# Patient Record
Sex: FEMALE | Race: WHITE | NOT HISPANIC OR LATINO
[De-identification: names, ages, dates, MRNs, and addresses within clinical notes are randomized per-mention and may not be internally consistent; named-entity substitution may affect disease eponyms.]

---

## 2017-06-09 ENCOUNTER — APPOINTMENT (OUTPATIENT)
Dept: OTOLARYNGOLOGY | Facility: CLINIC | Age: 67
End: 2017-06-09

## 2017-11-27 ENCOUNTER — APPOINTMENT (OUTPATIENT)
Dept: OTOLARYNGOLOGY | Facility: CLINIC | Age: 67
End: 2017-11-27
Payer: MEDICARE

## 2017-11-27 VITALS
SYSTOLIC BLOOD PRESSURE: 130 MMHG | DIASTOLIC BLOOD PRESSURE: 77 MMHG | HEART RATE: 60 BPM | TEMPERATURE: 97.4 F | OXYGEN SATURATION: 100 %

## 2017-11-27 PROCEDURE — 92557 COMPREHENSIVE HEARING TEST: CPT

## 2017-11-27 PROCEDURE — 99214 OFFICE O/P EST MOD 30 MIN: CPT

## 2017-11-27 PROCEDURE — 92550 TYMPANOMETRY & REFLEX THRESH: CPT

## 2017-11-27 RX ORDER — NEOMYCIN SULFATE, POLYMYXIN B SULFATE AND DEXAMETHASONE 3.5; 10000; 1 MG/ML; [USP'U]/ML; MG/ML
3.5-10000-0.1 SUSPENSION OPHTHALMIC
Qty: 5 | Refills: 0 | Status: ACTIVE | COMMUNITY
Start: 2017-01-24

## 2017-11-27 RX ORDER — ESTROGENS, CONJUGATED 0.3 MG/1
0.3 TABLET, FILM COATED ORAL
Qty: 30 | Refills: 0 | Status: ACTIVE | COMMUNITY
Start: 2017-01-27

## 2018-09-02 ENCOUNTER — INPATIENT (INPATIENT)
Facility: HOSPITAL | Age: 68
LOS: 0 days | Discharge: HOME | End: 2018-09-03
Attending: HOSPITALIST | Admitting: HOSPITALIST

## 2018-09-02 VITALS
OXYGEN SATURATION: 100 % | SYSTOLIC BLOOD PRESSURE: 164 MMHG | HEART RATE: 88 BPM | RESPIRATION RATE: 20 BRPM | TEMPERATURE: 98 F | DIASTOLIC BLOOD PRESSURE: 72 MMHG

## 2018-09-02 LAB
ALBUMIN SERPL ELPH-MCNC: 4.7 G/DL — SIGNIFICANT CHANGE UP (ref 3.5–5.2)
ALP SERPL-CCNC: 63 U/L — SIGNIFICANT CHANGE UP (ref 30–115)
ALT FLD-CCNC: 27 U/L — SIGNIFICANT CHANGE UP (ref 0–41)
ANION GAP SERPL CALC-SCNC: 19 MMOL/L — HIGH (ref 7–14)
APTT BLD: 29.7 SEC — SIGNIFICANT CHANGE UP (ref 27–39.2)
AST SERPL-CCNC: 35 U/L — SIGNIFICANT CHANGE UP (ref 0–41)
BASOPHILS # BLD AUTO: 0.03 K/UL — SIGNIFICANT CHANGE UP (ref 0–0.2)
BASOPHILS NFR BLD AUTO: 0.2 % — SIGNIFICANT CHANGE UP (ref 0–1)
BILIRUB SERPL-MCNC: 0.4 MG/DL — SIGNIFICANT CHANGE UP (ref 0.2–1.2)
BLD GP AB SCN SERPL QL: SIGNIFICANT CHANGE UP
BUN SERPL-MCNC: 17 MG/DL — SIGNIFICANT CHANGE UP (ref 10–20)
CALCIUM SERPL-MCNC: 9.5 MG/DL — SIGNIFICANT CHANGE UP (ref 8.5–10.1)
CHLORIDE SERPL-SCNC: 95 MMOL/L — LOW (ref 98–110)
CK SERPL-CCNC: 125 U/L — SIGNIFICANT CHANGE UP (ref 0–225)
CO2 SERPL-SCNC: 22 MMOL/L — SIGNIFICANT CHANGE UP (ref 17–32)
CREAT SERPL-MCNC: 0.7 MG/DL — SIGNIFICANT CHANGE UP (ref 0.7–1.5)
EOSINOPHIL # BLD AUTO: 0.04 K/UL — SIGNIFICANT CHANGE UP (ref 0–0.7)
EOSINOPHIL NFR BLD AUTO: 0.3 % — SIGNIFICANT CHANGE UP (ref 0–8)
GLUCOSE SERPL-MCNC: 154 MG/DL — HIGH (ref 70–99)
HCT VFR BLD CALC: 38.5 % — SIGNIFICANT CHANGE UP (ref 37–47)
HGB BLD-MCNC: 13.1 G/DL — SIGNIFICANT CHANGE UP (ref 12–16)
IMM GRANULOCYTES NFR BLD AUTO: 0.2 % — SIGNIFICANT CHANGE UP (ref 0.1–0.3)
INR BLD: 0.96 RATIO — SIGNIFICANT CHANGE UP (ref 0.65–1.3)
LYMPHOCYTES # BLD AUTO: 1.58 K/UL — SIGNIFICANT CHANGE UP (ref 1.2–3.4)
LYMPHOCYTES # BLD AUTO: 12.6 % — LOW (ref 20.5–51.1)
MCHC RBC-ENTMCNC: 30.8 PG — SIGNIFICANT CHANGE UP (ref 27–31)
MCHC RBC-ENTMCNC: 34 G/DL — SIGNIFICANT CHANGE UP (ref 32–37)
MCV RBC AUTO: 90.6 FL — SIGNIFICANT CHANGE UP (ref 81–99)
MONOCYTES # BLD AUTO: 0.39 K/UL — SIGNIFICANT CHANGE UP (ref 0.1–0.6)
MONOCYTES NFR BLD AUTO: 3.1 % — SIGNIFICANT CHANGE UP (ref 1.7–9.3)
NEUTROPHILS # BLD AUTO: 10.49 K/UL — HIGH (ref 1.4–6.5)
NEUTROPHILS NFR BLD AUTO: 83.6 % — HIGH (ref 42.2–75.2)
NRBC # BLD: 0 /100 WBCS — SIGNIFICANT CHANGE UP (ref 0–0)
PLATELET # BLD AUTO: 175 K/UL — SIGNIFICANT CHANGE UP (ref 130–400)
POTASSIUM SERPL-MCNC: 4 MMOL/L — SIGNIFICANT CHANGE UP (ref 3.5–5)
POTASSIUM SERPL-SCNC: 4 MMOL/L — SIGNIFICANT CHANGE UP (ref 3.5–5)
PROT SERPL-MCNC: 8 G/DL — SIGNIFICANT CHANGE UP (ref 6–8)
PROTHROM AB SERPL-ACNC: 10.4 SEC — SIGNIFICANT CHANGE UP (ref 9.95–12.87)
RBC # BLD: 4.25 M/UL — SIGNIFICANT CHANGE UP (ref 4.2–5.4)
RBC # FLD: 13.6 % — SIGNIFICANT CHANGE UP (ref 11.5–14.5)
SODIUM SERPL-SCNC: 136 MMOL/L — SIGNIFICANT CHANGE UP (ref 135–146)
TROPONIN T SERPL-MCNC: <0.01 NG/ML — SIGNIFICANT CHANGE UP
TYPE + AB SCN PNL BLD: SIGNIFICANT CHANGE UP
WBC # BLD: 12.56 K/UL — HIGH (ref 4.8–10.8)
WBC # FLD AUTO: 12.56 K/UL — HIGH (ref 4.8–10.8)

## 2018-09-02 RX ORDER — HYDROMORPHONE HYDROCHLORIDE 2 MG/ML
1 INJECTION INTRAMUSCULAR; INTRAVENOUS; SUBCUTANEOUS ONCE
Qty: 0 | Refills: 0 | Status: DISCONTINUED | OUTPATIENT
Start: 2018-09-02 | End: 2018-09-02

## 2018-09-02 RX ORDER — ATORVASTATIN CALCIUM 80 MG/1
40 TABLET, FILM COATED ORAL ONCE
Qty: 0 | Refills: 0 | Status: COMPLETED | OUTPATIENT
Start: 2018-09-02 | End: 2018-09-02

## 2018-09-02 RX ORDER — ASPIRIN/CALCIUM CARB/MAGNESIUM 324 MG
325 TABLET ORAL ONCE
Qty: 0 | Refills: 0 | Status: DISCONTINUED | OUTPATIENT
Start: 2018-09-02 | End: 2018-09-02

## 2018-09-02 RX ORDER — METOCLOPRAMIDE HCL 10 MG
10 TABLET ORAL ONCE
Qty: 0 | Refills: 0 | Status: COMPLETED | OUTPATIENT
Start: 2018-09-02 | End: 2018-09-02

## 2018-09-02 RX ORDER — ACETAMINOPHEN 500 MG
650 TABLET ORAL ONCE
Qty: 0 | Refills: 0 | Status: COMPLETED | OUTPATIENT
Start: 2018-09-02 | End: 2018-09-02

## 2018-09-02 RX ORDER — ONDANSETRON 8 MG/1
4 TABLET, FILM COATED ORAL ONCE
Qty: 0 | Refills: 0 | Status: COMPLETED | OUTPATIENT
Start: 2018-09-02 | End: 2018-09-02

## 2018-09-02 RX ADMIN — ONDANSETRON 4 MILLIGRAM(S): 8 TABLET, FILM COATED ORAL at 22:35

## 2018-09-02 RX ADMIN — Medication 10 MILLIGRAM(S): at 23:30

## 2018-09-02 NOTE — H&P ADULT - NSHPPHYSICALEXAM_GEN_ALL_CORE
VITALS:   T(F): 98.2  HR: 84  BP: 155/61  RR: 18  SpO2: 100%    PHYSICAL EXAM:  GEN: No acute distress  LUNGS: Clear to auscultation bilaterally   HEART: S1/S2 present. RRR.   ABD: Soft, non-tender, non-distended. Bowel sounds present  EXT: NC/NC/NE/ANDRADE  NEURO: AAOX3, L sided numbness VITALS:   T(F): 98.2  HR: 84  BP: 155/61  RR: 18  SpO2: 100%    PHYSICAL EXAM:  GEN: No acute distress  LUNGS: Clear to auscultation bilaterally   HEART: S1/S2 present. RRR.   ABD: Soft, non-tender, non-distended. Bowel sounds present  EXT: NC/NC/NE/ANDRADE  NEURO: AAOX3

## 2018-09-02 NOTE — ED PROVIDER NOTE - PHYSICAL EXAMINATION
VITAL SIGNS: I have reviewed nursing notes and confirm.  CONSTITUTIONAL: Well-developed; well-nourished; in no acute distress. mild l. sided droop  SKIN: skin exam is warm and dry, no acute rash.   HEAD: Normocephalic; atraumatic.  EYES:  EOM intact; conjunctiva and sclera clear.  ENT: No nasal discharge; airway clear. moist oral mucosa; uvula at midline. no pharyngeal erythema, edema exudate or vesicles.   NECK: Supple; non tender.  midline tongue protrusion at midline  CARD: S1, S2 normal; no murmurs, gallops, or rubs. Regular rate and rhythm. posterior tibial and radial pulses 2+  RESP: No wheezes, rales or rhonchi. cta b/l. no use of accessory muscles. no retractions  ABD: Normal bowel sounds; soft; non-distended; non-tender; no rebound.   EXT: Normal ROM. No  cyanosis or edema.  BACK: No cva tenderness  LYMPH: No acute cervical adenopathy.  NEURO: Alert, oriented, grossly unremarkable.  CN 2-12 intact-- except 7- l. droop on l. side of lip; forehead the same.. normal gait. sensory grossly intact to face, upper and lower extremity.  2/5 strength to  Karen.side upper arm.  Lower extremity 5/5 strength b/l  PSYCH: Cooperative, appropriate.

## 2018-09-02 NOTE — ED PROVIDER NOTE - CARE PLAN
Principal Discharge DX:	Numbness and tingling in left arm  Secondary Diagnosis:	Chest pain, unspecified type  Secondary Diagnosis:	Acute nonintractable headache, unspecified headache type

## 2018-09-02 NOTE — H&P ADULT - ASSESSMENT
67 F w/ pmh of migraines, Osteoporosis, OA, presented to the ED c/o headace, nausea, vomiting, l sided numbness, and weakness, stroke code was called, a cth was neg, pt was evaluated by Dr. Rangel (neurology attending), NIH score was 1 (sxs resolved besides l sided numbness), Tpa was offered, pt and family refused tpa, pt being admitted to the stroke unit for further monitoring and workup.    # TIA vs Stroke vs Migraine  - MRI  - F/u Official CTangio results  - Asp, Lipitor  - Neurochecks q4h  - F/u neuro recs in AM    # HO Migraines / OA / Osteoporosis  - Home meds    # DVTppx: Lovenox  # Regular diet  # Full code

## 2018-09-02 NOTE — ED PROVIDER NOTE - NS ED ROS FT
ROS: No fever/chills, No/photophobia/eye pain/changes in vision, No ear pain/sore throat/dysphagia, No palpitations, no SOB/cough/wheeze/stridor, No abdominal pain, No N/V/D/melena, no dysuria/frequency/discharge, No neck/back pain, no rash,     +chest pain +l. sided weakness. +headache

## 2018-09-02 NOTE — ED ADULT NURSE NOTE - CHPI ED NUR SYMPTOMS NEG
no loss of consciousness/no change in level of consciousness/no fever/no dizziness/no blurred vision/no confusion

## 2018-09-02 NOTE — ED ADULT NURSE NOTE - NSIMPLEMENTINTERV_GEN_ALL_ED
Implemented All Fall with Harm Risk Interventions:  [place] to call system. Call bell, personal items and telephone within reach. Instruct patient to call for assistance. Room bathroom lighting operational. Non-slip footwear when patient is off stretcher. Physically safe environment: no spills, clutter or unnecessary equipment. Stretcher in lowest position, wheels locked, appropriate side rails in place. Provide visual cue, wrist band, yellow gown, etc. Monitor gait and stability. Monitor for mental status changes and reorient to person, place, and time. Review medications for side effects contributing to fall risk. Reinforce activity limits and safety measures with patient and family. Provide visual clues: red socks.

## 2018-09-02 NOTE — H&P ADULT - HISTORY OF PRESENT ILLNESS
67 F w/ pmh of migraines, Osteoporosis, OA, presented to the ED by EMS c/o headache, nausea, vomiting, l sided numbness & weakness, she also had a facial droop, a stroke code was called in the ED. The onset of the pts symptoms began around 7pm, After a CTH was negative, Dr. Rangel evaluated the patient through tele-communications, total NIH score was 1, at the time of eval all sxs had resolved except l sided numbness, the pt and family refused Tpa, they insisted sxs are being cause by her migraine. Aspirin 325, Lipitor 40, & Reglan were give to the pt. 67 F w/ pmh of migraines, Osteoporosis, OA, not on any meds besides advil prn presented to the ED by EMS c/o headache, nausea, vomiting, l sided numbness & weakness, she also had a facial droop, a stroke code was called in the ED. The onset of the pts symptoms began around 7pm, after she ate, she said she began having heartburn which she says triggers her migraines. After a CTH was negative, Dr. Rangel evaluated the patient through tele-communications, total NIH score was 1, at the time of that eval all sxs had resolved except L sided numbness, the pt and family refused Tpa, they insisted sxs are being cause by her migraine. Pts L sided numbness resolved as well. Aspirin 325, Lipitor 40, & Reglan were give to the pt.

## 2018-09-02 NOTE — H&P ADULT - NSHPLABSRESULTS_GEN_ALL_CORE
LABS:                        13.1   12.56 )-----------( 175      ( 02 Sep 2018 22:20 )             38.5     09-02    136  |  95<L>  |  17  ----------------------------<  154<H>  4.0   |  22  |  0.7    Ca    9.5      02 Sep 2018 22:20    TPro  8.0  /  Alb  4.7  /  TBili  0.4  /  DBili  x   /  AST  35  /  ALT  27  /  AlkPhos  63  09-02    PT/INR - ( 02 Sep 2018 22:20 )   PT: 10.40 sec;   INR: 0.96 ratio         PTT - ( 02 Sep 2018 22:20 )  PTT:29.7 sec      Creatine Kinase, Serum: 125 U/L (09-02-18 @ 22:20)  Troponin T, Serum: <0.01 ng/mL (09-02-18 @ 22:20)      CARDIAC MARKERS ( 02 Sep 2018 22:20 )  x     / <0.01 ng/mL / 125 U/L / x     / x          RADIOLOGY:  CTH  IMPRESSION:  1.  No evidence of acute intracranial pathology.  2.  Chronic microvascular ischemic changes as above.    Dr. Bladimir Daigle discussed preliminary findings with ERI ACUNA MD   on 9/2/2018 11:30 PM with readback.    CTN ANGIO  IMPRESSION:  1.  No perfusion defects to suggest acute cerebral ischemia.  2.  Unremarkable CTA of the head and neck.  PRELIM READ

## 2018-09-02 NOTE — ED PROVIDER NOTE - ATTENDING CONTRIBUTION TO CARE
67y F PMH Osteoporosis, Migraine, who presents with acute Headache with chest pain, LUE paresthesias and weakness to left upper arm. Onset was 7 pm and patient within Stroke Code Window.  Stroke Code called.  FS done and normal, patient initially hypertensive.  Patient has 5/5 power to left arm but mildly decreased  strength.  Stroke score 1 for subjective decreased sensation to left side of body. Given CP history and headache with neuro symptoms, aortic dissection considered.  Patient rushed for non-contrast head CT and CT angiogram/perfusion.  This was lowered to include aortic arch.  CTA negative for proximal dissection or LVO.  After CT scans, patient assessed by Dr. Rangel on Tele Stroke.  Patient found to have residual persistent numbness L side currently unchanged from my exam and again an NIH Stroke score of 1.  Thrombolysis with IV TPA discussed with patient and family in detail by Dr. Rangle and myself. Patient and family do not want t-PA at this time due to risks of the medication and the small stroke score. They are aware of the time limitations in this decision. All questions and concerns at the time of evaluation answered and addressed.      Patient already took Aspirin.  Atorvastatin given and will give headache medication.  Complicated Migraine considered.  Patient low risk for chest pain with low HEART score and non-ischemic EKG.  Left sided chest pain is reproducible and happened after patient vomited.  May be musculoskeletal in nature.  Patient does not have chest pain going to back and is currently (11:52 pm) chest pain free.  I do not feel a full dissection study through descending/abdominal aorta is warranted at this time.  Patient to be admitted to Stroke Unit for MRI and further stroke work up, repeat enzymes.

## 2018-09-02 NOTE — ED ADULT NURSE REASSESSMENT NOTE - NS ED NURSE REASSESS COMMENT FT1
Pt arrived with 4 family members presented with stroke-like symptoms c/o left arm tingling, weakness, chest pain, nausea and vomiting since 7 pm today. On arrival code stroke was called, pt was taken to the CT scan after Zofran was administered for vomiting. While pt was evaluated by the resident, family demanded an attending and not the resident. When Dr Subramanian came to evaluated the pt., family became very rude towards him. After pt came back from CT scan, tele stroke was activated as per protocol. When tele RN came on the screen, all the information was provided to her and one of the family members made a comment that this is not a doctor but an RN, they started demanding the doctor again. Family was told that the doctor will appear on the screen right after all the information in taken by ICU RN. Family kept interrupting my conversation with tele RN and than they asked me where the tele team is located. I answered that I do not know, which made family member to interrupt the tele RN and ask her where they are located Tele RN answered that they are located in [place] The family member told another family member : "Well, now she knows". During my care for the patient family members were very disrespectful towards all the team in the ER including EMS they were constantly staying on our way of treating the patient. Charge RN and security were called for assistance Family were told that they will have to leave if they will continue presenting the same behavior.

## 2018-09-02 NOTE — CONSULT NOTE ADULT - SUBJECTIVE AND OBJECTIVE BOX
***STROKE ALERT - TELESTROKE CONSULTATION  09-02-18 @ 23:24  Last known normal time:	 19:00 EDT  Consultant paged:	  22:15 EDT  Video start:	              22:55 EDT  Video end:	              23:15 EDT  Video total:	              20 mins    This is a telehealth visit and the patient is being seen on  9/2/2018 using bi-directional audio/video at the request of Dr. Subramanian at [facility].  	                                                     Chief Complaint: stroke alert    Last known normal time: 19:00 EDT    HPI:  68 yo RhF w/ h/o migraine, OA/OP currently p/w acute HA/CP with nausea with L hemisensory loss currently persistent.  Symptoms started at 19:00 EDT currently persistent numbness.  No weakness, diplopia, visual obscuration, vertigo.  No prior episodes.  Denies ICH, aneurysm, trauma, recent surgery,  no bleeding diathesis.  no anticoagulation use.    PAST MEDICAL & SURGICAL HISTORY:  migraine  OA/OP    FAMILY HISTORY:  [place]    Social History: (-) x 3    Allergies    No Known Allergies    Intolerances    MEDICATIONS  (STANDING):  acetaminophen   Tablet. 650 milliGRAM(s) Oral once  atorvastatin 40 milliGRAM(s) Oral once  HYDROmorphone  Injectable 1 milliGRAM(s) IV Push Once  metoclopramide Injectable 10 milliGRAM(s) IV Push once  ondansetron Injectable 4 milliGRAM(s) IV Push once    Review of systems:    Constitutional: No fever, weight loss or fatigue    Eyes: No eye pain or discharge  ENMT:  No difficulty hearing; No sinus or throat pain  Neck: No pain or stiffness  Respiratory: No cough, wheezing, chills or hemoptysis  Cardiovascular: No chest pain, palpitations, shortness of breath, dyspnea on exertion  Gastrointestinal: No abdominal pain, nausea, vomiting or hematemesis; No diarrhea or constipation.   Genitourinary: No dysuria, frequency, hematuria or incontinence  Neurological: As per HPI  Skin: No rashes or lesions   Endocrine: No heat or cold intolerance; No hair loss  Musculoskeletal: No joint pain or swelling  Psychiatric: No depression, anxiety, mood swings  Heme/Lymph: No easy bruising or bleeding gums    Vital Signs Last 24 Hrs  T(C): --  T(F): --  HR: --  BP: --  BP(mean): --  RR: --  SpO2: --    Video assisted telemedicine examination performed w/ RN and family at bedside.    Neurologic Examination:  General:  Appearance is consistent with chronologic age.  No abnormal facies.   General: The patient is oriented to person, place, time and date.   Fund of knowledge is intact and normal.  Language with normal repetition, comprehension and naming.  Nondysarthric.    Cranial nerves: intact VA, VFF.  EOMI w/o nystagmus, skew or reported double vision.  PERRL.  No ptosis/weakness of eyelid closure.  Facial sensation is normal with normal bite.  No facial asymmetry.  Hearing grossly intact b/l.  Palate elevates midline.  Tongue midline.  Motor examination:   Normal tone, bulk and range of motion.  No tenderness, twitching, tremors or involuntary movements.  Formal Muscle Strength Testing: (MRC grade R/L) 5/5 UE; 5/5 LE.  No observable drift.  Reflexes:   2+ b/l pectoralis, biceps, triceps, brachioradialis, patella and Achilles.  Plantar response downgoing b/l.  Jaw jerk, Norman, clonus absent.  Sensory examination:   subjectively decreased to light touch L side.  Cerebellum:   FTN/HKS intact with normal RICKEY in all limbs.  No dysmetria or dysdiadokinesia.      NIH STROKE SCALE  Item	                                                        Score  1 a.	Level of Consciousness	                0  1 b. LOC Questions	                                0  1 c.	LOC Commands	                                0  2.	Best Gaze	                                        0  3.	Visual	                                                0  4.	Facial Palsy	                                        0  5 a.	Motor Arm - Left	                                0  5 b.	Motor Arm - Right	                        0  6 a.	Motor Leg - Left	                                0  6 b.	Motor Leg - Right	                                0  7.	Limb Ataxia	                                        0  8.	Sensory	                                                1  9.	Language	                                        0  10.	Dysarthria	                                        0  11.	Extinction and Inattention  	        0  ______________________________________  TOTAL	                                                        1    Labs:   CBC Full  -  ( 02 Sep 2018 22:20 )  WBC Count : 12.56 K/uL  Hemoglobin : 13.1 g/dL  Hematocrit : 38.5 %  Platelet Count - Automated : 175 K/uL  Mean Cell Volume : 90.6 fL  Mean Cell Hemoglobin : 30.8 pg  Mean Cell Hemoglobin Concentration : 34.0 g/dL  Auto Neutrophil # : 10.49 K/uL  Auto Lymphocyte # : 1.58 K/uL  Auto Monocyte # : 0.39 K/uL  Auto Eosinophil # : 0.04 K/uL  Auto Basophil # : 0.03 K/uL  Auto Neutrophil % : 83.6 %  Auto Lymphocyte % : 12.6 %  Auto Monocyte % : 3.1 %  Auto Eosinophil % : 0.3 %  Auto Basophil % : 0.2 %    09-02    136  |  95<L>  |  17  ----------------------------<  154<H>  4.0   |  22  |  0.7    Ca    9.5      02 Sep 2018 22:20    TPro  8.0  /  Alb  4.7  /  TBili  0.4  /  DBili  x   /  AST  35  /  ALT  27  /  AlkPhos  63  09-02    LIVER FUNCTIONS - ( 02 Sep 2018 22:20 )  Alb: 4.7 g/dL / Pro: 8.0 g/dL / ALK PHOS: 63 U/L / ALT: 27 U/L / AST: 35 U/L / GGT: x           PT/INR - ( 02 Sep 2018 22:20 )   PT: 10.40 sec;   INR: 0.96 ratio         PTT - ( 02 Sep 2018 22:20 )  PTT:29.7 sec    POCT Blood Glucose.: 141 mg/dL (09-02-18 @ 22:17)      Neuroimaging:  NCHCT:   CT Angiography/Perfusion (if applicable):  MRI Brain NC (if applicable):  MRA Head/Neck (if applicable):  Echocardiography:  [place]/Transcranial Duplex:    Assessment:  This is a 67y Female with h/o migraine, OP/OA p/w acute HA w/ CP, LUE paresthesias with residual persistent numbness L side currently unchanged.  Pt is a candidate for thrombolysis with IV TPA. Risks and benefits including alternatives to treatment with thrombolysis with IV TPA (alteplase) discussed with patient/family at length including significant morbidity/mortality with or without treatment in acute stroke setting.  All questions and concerns at the time of evaluation answered and addressed.  Pt and family currently declines thrombolysis.  Recommend f/u CTA results r/o dissection.  Recommend admit for further stroke management and w/u.      Treat with IV TPA:                 No  IV TPA bolus time:   TPA total dose:  TPA bolus dose:  If NO IV TPA, then why:        Pt/family declined    Neurovascular intervention candidate:    No	  If NO intervention, then why:     no LVO    Plan:   - MRI Brain NC  - f/u official CTA: if no LVO or dissection, may admit to stroke unit  -serial neurochecks Q4 hrs after CTA  - cont ASA 81, Lipitor 40  - cardiac telemetry  - permissive BP keep SBP b/w 130 - 170    09-02-18 @ 23:24          Neurologic Examination:  General:  Appearance is consistent with chronologic age.  No abnormal facies.   General: The patient is oriented to person, place, time and date.   Fund of knowledge is intact and normal.  Language with normal repetition, comprehension and naming.  Nondysarthric.    Cranial nerves: intact VA, VFF.  EOMI w/o nystagmus, skew or reported double vision.  PERRL.  No ptosis/weakness of eyelid closure.  Facial sensation is normal with normal bite.  No facial asymmetry.  Hearing grossly intact b/l.  Palate elevates midline.  Tongue midline.  Motor examination:   Normal tone, bulk and range of motion.  No tenderness, twitching, tremors or involuntary movements.  Formal Muscle Strength Testing: (MRC grade R/L) 5/5 UE; 5/5 LE.  No observable drift.  Reflexes:   2+ b/l pectoralis, biceps, triceps, brachioradialis, patella and Achilles.  Plantar response downgoing b/l.  Jaw jerk, Norman, clonus absent.  Sensory examination:   subjectively decreased to light touch L side.  Cerebellum:   FTN/HKS intact with normal RICKEY in all limbs.  No dysmetria or dysdiadokinesia.    NIH Stroke Scale: 1    Labs:   CBC Full  -  ( 02 Sep 2018 22:20 )  WBC Count : 12.56 K/uL  Hemoglobin : 13.1 g/dL  Hematocrit : 38.5 %  Platelet Count - Automated : 175 K/uL  Mean Cell Volume : 90.6 fL  Mean Cell Hemoglobin : 30.8 pg  Mean Cell Hemoglobin Concentration : 34.0 g/dL  Auto Neutrophil # : 10.49 K/uL  Auto Lymphocyte # : 1.58 K/uL  Auto Monocyte # : 0.39 K/uL  Auto Eosinophil # : 0.04 K/uL  Auto Basophil # : 0.03 K/uL  Auto Neutrophil % : 83.6 %  Auto Lymphocyte % : 12.6 %  Auto Monocyte % : 3.1 %  Auto Eosinophil % : 0.3 %  Auto Basophil % : 0.2 %    09-02    136  |  95<L>  |  17  ----------------------------<  154<H>  4.0   |  22  |  0.7    Ca    9.5      02 Sep 2018 22:20    TPro  8.0  /  Alb  4.7  /  TBili  0.4  /  DBili  x   /  AST  35  /  ALT  27  /  AlkPhos  63  09-02    LIVER FUNCTIONS - ( 02 Sep 2018 22:20 )  Alb: 4.7 g/dL / Pro: 8.0 g/dL / ALK PHOS: 63 U/L / ALT: 27 U/L / AST: 35 U/L / GGT: x           PT/INR - ( 02 Sep 2018 22:20 )   PT: 10.40 sec;   INR: 0.96 ratio         PTT - ( 02 Sep 2018 22:20 )  PTT:29.7 sec    POCT Blood Glucose.: 141 mg/dL (09-02-18 @ 22:17)    CTH: (prelim) no acute intracranial pathology  CTA: (prelim): no LVO    Assessment:  This is a 67y Female with h/o migraine, OP/OA p/w acute HA w/ CP, LUE paresthesias with residual persistent numbness L side currently unchanged.  Pt is a candidate for thrombolysis with IV TPA. Risks and benefits including alternatives to treatment with thrombolysis with IV TPA (alteplase) discussed with patient/family at length including significant morbidity/mortality with or without treatment in acute stroke setting.  All questions and concerns at the time of evaluation answered and addressed.  Pt and family currently declines thrombolysis.  Recommend f/u CTA results r/o dissection.  Recommend admit for further stroke management and w/u.    Plan:   - MRI Brain NC  - f/u official CTA: if no LVO or dissection, may admit to stroke unit  -serial neurochecks Q4 hrs after CTA  - cont ASA 81, Lipitor 40  - cardiac telemetry  - permissive BP keep SBP b/w 130 - 170    09-02-18 @ 23:15

## 2018-09-02 NOTE — ED PROVIDER NOTE - OBJECTIVE STATEMENT
68y/o F w/ hx of arthritis presents for generalized headache, nausea, left sided weakness, L. tight chest pain. 68y/o F w/ hx of arthritis presents for generalized headache, nausea, left sided weakness, L. tight chest pain that started at 7pm. no  loc.  pt received 325mg of aspirin by ems. 66y/o F w/ hx of arthritis presents for generalized headache, nausea, left sided numbness, weakness, L. tight chest pain that started at 7pm. no  loc.  pt received 325mg of aspirin by ems.

## 2018-09-02 NOTE — ED PROVIDER NOTE - MEDICAL DECISION MAKING DETAILS
Thrombolysis with IV TPA discussed with patient and family in detail by Dr. Rangel and myself. Patient and family do not want t-PA at this time due to risks of the medication and the small stroke score. They are aware of the time limitations in this decision. All questions and concerns at the time of evaluation answered and addressed.      Patient already took Aspirin.  Atorvastatin given and will give headache medication.  Complicated Migraine considered.  Patient low risk for chest pain with low HEART score and non-ischemic EKG.  Left sided chest pain is reproducible and happened after patient vomited.  May be musculoskeletal in nature.  Patient does not have chest pain going to back and is currently (11:52 pm) chest pain free.  I do not feel a full dissection study through descending/abdominal aorta is warranted at this time.  Patient to be admitted to Stroke Unit for MRI and further stroke work up, repeat enzymes.

## 2018-09-02 NOTE — H&P ADULT - ATTENDING COMMENTS
Patient seen and examined independently. Case discussed with housestaff, nursing, social, patient, daughters. I agree with the resident's note, physical exam, and plan except as below.     67 F w/ pmh of migraines, Osteoporosis, OA, multiple food allergies presented to the ED c/o headache, nausea, vomiting, Lt breast tenderness, LUE sided numbness, and weakness after eating at a restaurant. Stroke code was called, a cth was neg, pt was evaluated by Dr. Rangel (neurology attending), NIH score was 1 (sxs resolved besides l sided numbness), Tpa was offered, pt and family refused tpa, pt being admitted to the stroke unit for further monitoring and workup. Today, LUE numbness resolved.    Denies CP, SOB, D/C/AP, cough, F, chills, dizziness, new focal weakness, HA, vision changes, dysuria, or urinary symptoms, blood in stool.    ROS: all systems unremarkable except as above.     Gen: NAD, AA0x3  HEENT: PERRLA, EOM, no LAD  CV: nl S1 S2  Resp: decreased BS b/l  GI: NT/ND/S +BS  MS: no c/c/e  Neuro: decreased sens Lt lower face, mild Lt facial droop    # TIA vs Stroke vs Migraine  - MRI brain   - Asp, Lipitor  - Neurochecks q4h  - F/u neuro recs in AM    # HO Migraines / OA / Osteoporosis  - Home meds    # DVTppx: Lovenox  # Regular diet  # Full code    d/w family in great detail

## 2018-09-03 ENCOUNTER — TRANSCRIPTION ENCOUNTER (OUTPATIENT)
Age: 68
End: 2018-09-03

## 2018-09-03 VITALS
HEART RATE: 70 BPM | RESPIRATION RATE: 18 BRPM | SYSTOLIC BLOOD PRESSURE: 118 MMHG | DIASTOLIC BLOOD PRESSURE: 57 MMHG | TEMPERATURE: 98 F

## 2018-09-03 LAB
ANION GAP SERPL CALC-SCNC: 12 MMOL/L — SIGNIFICANT CHANGE UP (ref 7–14)
APPEARANCE UR: CLEAR — SIGNIFICANT CHANGE UP
BACTERIA # UR AUTO: ABNORMAL /HPF
BASOPHILS # BLD AUTO: 0.03 K/UL — SIGNIFICANT CHANGE UP (ref 0–0.2)
BASOPHILS NFR BLD AUTO: 0.3 % — SIGNIFICANT CHANGE UP (ref 0–1)
BILIRUB UR-MCNC: NEGATIVE — SIGNIFICANT CHANGE UP
BUN SERPL-MCNC: 12 MG/DL — SIGNIFICANT CHANGE UP (ref 10–20)
CALCIUM SERPL-MCNC: 8.7 MG/DL — SIGNIFICANT CHANGE UP (ref 8.5–10.1)
CHLORIDE SERPL-SCNC: 103 MMOL/L — SIGNIFICANT CHANGE UP (ref 98–110)
CHOLEST SERPL-MCNC: 201 MG/DL — HIGH (ref 100–200)
CK MB CFR SERPL CALC: 2.4 NG/ML — SIGNIFICANT CHANGE UP (ref 0.6–6.3)
CK SERPL-CCNC: 102 U/L — SIGNIFICANT CHANGE UP (ref 0–225)
CO2 SERPL-SCNC: 26 MMOL/L — SIGNIFICANT CHANGE UP (ref 17–32)
COLOR SPEC: YELLOW — SIGNIFICANT CHANGE UP
CREAT SERPL-MCNC: 0.7 MG/DL — SIGNIFICANT CHANGE UP (ref 0.7–1.5)
DIFF PNL FLD: NEGATIVE — SIGNIFICANT CHANGE UP
EOSINOPHIL # BLD AUTO: 0.03 K/UL — SIGNIFICANT CHANGE UP (ref 0–0.7)
EOSINOPHIL NFR BLD AUTO: 0.3 % — SIGNIFICANT CHANGE UP (ref 0–8)
EPI CELLS # UR: ABNORMAL /HPF
GLUCOSE SERPL-MCNC: 82 MG/DL — SIGNIFICANT CHANGE UP (ref 70–99)
GLUCOSE UR QL: NEGATIVE — SIGNIFICANT CHANGE UP
HCT VFR BLD CALC: 36.5 % — LOW (ref 37–47)
HDLC SERPL-MCNC: 85 MG/DL — SIGNIFICANT CHANGE UP
HGB BLD-MCNC: 11.7 G/DL — LOW (ref 12–16)
IMM GRANULOCYTES NFR BLD AUTO: 0.2 % — SIGNIFICANT CHANGE UP (ref 0.1–0.3)
KETONES UR-MCNC: 15
LEUKOCYTE ESTERASE UR-ACNC: ABNORMAL
LIPID PNL WITH DIRECT LDL SERPL: 119 MG/DL — SIGNIFICANT CHANGE UP (ref 4–129)
LYMPHOCYTES # BLD AUTO: 1.87 K/UL — SIGNIFICANT CHANGE UP (ref 1.2–3.4)
LYMPHOCYTES # BLD AUTO: 20.8 % — SIGNIFICANT CHANGE UP (ref 20.5–51.1)
MCHC RBC-ENTMCNC: 29.5 PG — SIGNIFICANT CHANGE UP (ref 27–31)
MCHC RBC-ENTMCNC: 32.1 G/DL — SIGNIFICANT CHANGE UP (ref 32–37)
MCV RBC AUTO: 92.2 FL — SIGNIFICANT CHANGE UP (ref 81–99)
MONOCYTES # BLD AUTO: 0.49 K/UL — SIGNIFICANT CHANGE UP (ref 0.1–0.6)
MONOCYTES NFR BLD AUTO: 5.5 % — SIGNIFICANT CHANGE UP (ref 1.7–9.3)
NEUTROPHILS # BLD AUTO: 6.55 K/UL — HIGH (ref 1.4–6.5)
NEUTROPHILS NFR BLD AUTO: 72.9 % — SIGNIFICANT CHANGE UP (ref 42.2–75.2)
NITRITE UR-MCNC: NEGATIVE — SIGNIFICANT CHANGE UP
NRBC # BLD: 0 /100 WBCS — SIGNIFICANT CHANGE UP (ref 0–0)
PH UR: 8 — SIGNIFICANT CHANGE UP (ref 5–8)
PLATELET # BLD AUTO: 163 K/UL — SIGNIFICANT CHANGE UP (ref 130–400)
POTASSIUM SERPL-MCNC: 4 MMOL/L — SIGNIFICANT CHANGE UP (ref 3.5–5)
POTASSIUM SERPL-SCNC: 4 MMOL/L — SIGNIFICANT CHANGE UP (ref 3.5–5)
PROT UR-MCNC: 30
RBC # BLD: 3.96 M/UL — LOW (ref 4.2–5.4)
RBC # FLD: 13.6 % — SIGNIFICANT CHANGE UP (ref 11.5–14.5)
SODIUM SERPL-SCNC: 141 MMOL/L — SIGNIFICANT CHANGE UP (ref 135–146)
SP GR SPEC: >=1.03 — SIGNIFICANT CHANGE UP (ref 1.01–1.03)
TOTAL CHOLESTEROL/HDL RATIO MEASUREMENT: 2.4 RATIO — LOW (ref 4–5.5)
TRIGL SERPL-MCNC: 60 MG/DL — SIGNIFICANT CHANGE UP (ref 10–149)
TROPONIN T SERPL-MCNC: <0.01 NG/ML — SIGNIFICANT CHANGE UP
UROBILINOGEN FLD QL: 0.2 — SIGNIFICANT CHANGE UP (ref 0.2–0.2)
WBC # BLD: 8.99 K/UL — SIGNIFICANT CHANGE UP (ref 4.8–10.8)
WBC # FLD AUTO: 8.99 K/UL — SIGNIFICANT CHANGE UP (ref 4.8–10.8)
WBC UR QL: ABNORMAL /HPF

## 2018-09-03 RX ORDER — ATORVASTATIN CALCIUM 80 MG/1
80 TABLET, FILM COATED ORAL AT BEDTIME
Qty: 0 | Refills: 0 | Status: DISCONTINUED | OUTPATIENT
Start: 2018-09-03 | End: 2018-09-03

## 2018-09-03 RX ORDER — ACETAMINOPHEN 500 MG
650 TABLET ORAL EVERY 6 HOURS
Qty: 0 | Refills: 0 | Status: DISCONTINUED | OUTPATIENT
Start: 2018-09-03 | End: 2018-09-03

## 2018-09-03 RX ORDER — ENOXAPARIN SODIUM 100 MG/ML
40 INJECTION SUBCUTANEOUS EVERY 24 HOURS
Qty: 0 | Refills: 0 | Status: DISCONTINUED | OUTPATIENT
Start: 2018-09-03 | End: 2018-09-03

## 2018-09-03 RX ORDER — PANTOPRAZOLE SODIUM 20 MG/1
40 TABLET, DELAYED RELEASE ORAL
Qty: 0 | Refills: 0 | Status: DISCONTINUED | OUTPATIENT
Start: 2018-09-03 | End: 2018-09-03

## 2018-09-03 RX ORDER — ATORVASTATIN CALCIUM 80 MG/1
1 TABLET, FILM COATED ORAL
Qty: 30 | Refills: 0 | OUTPATIENT
Start: 2018-09-03 | End: 2018-10-02

## 2018-09-03 RX ORDER — ASPIRIN/CALCIUM CARB/MAGNESIUM 324 MG
81 TABLET ORAL DAILY
Qty: 0 | Refills: 0 | Status: DISCONTINUED | OUTPATIENT
Start: 2018-09-03 | End: 2018-09-03

## 2018-09-03 RX ORDER — ASPIRIN/CALCIUM CARB/MAGNESIUM 324 MG
1 TABLET ORAL
Qty: 0 | Refills: 0 | COMMUNITY
Start: 2018-09-03

## 2018-09-03 RX ORDER — METOCLOPRAMIDE HCL 10 MG
10 TABLET ORAL EVERY 8 HOURS
Qty: 0 | Refills: 0 | Status: DISCONTINUED | OUTPATIENT
Start: 2018-09-03 | End: 2018-09-03

## 2018-09-03 RX ADMIN — Medication 81 MILLIGRAM(S): at 11:19

## 2018-09-03 RX ADMIN — ENOXAPARIN SODIUM 40 MILLIGRAM(S): 100 INJECTION SUBCUTANEOUS at 06:27

## 2018-09-03 RX ADMIN — PANTOPRAZOLE SODIUM 40 MILLIGRAM(S): 20 TABLET, DELAYED RELEASE ORAL at 06:28

## 2018-09-03 RX ADMIN — ATORVASTATIN CALCIUM 40 MILLIGRAM(S): 80 TABLET, FILM COATED ORAL at 00:23

## 2018-09-03 NOTE — PROGRESS NOTE ADULT - SUBJECTIVE AND OBJECTIVE BOX
Neurology Progress Note    Interval History:      HPI:  67 F w/ pmh of migraines, Osteoporosis, OA, not on any meds besides advil prn presented to the ED by EMS c/o headache, nausea, vomiting, l sided numbness & weakness, she also had a facial droop, a stroke code was called in the ED. The onset of the pts symptoms began around 7pm, after she ate, she said she began having heartburn which she says triggers her migraines. After a CTH was negative, Dr. Rangel evaluated the patient through tele-communications, total NIH score was 1, at the time of that eval all sxs had resolved except L sided numbness, the pt and family refused Tpa, they insisted sxs are being cause by her migraine. Pts L sided numbness resolved as well. Aspirin 325, Lipitor 40, & Reglan were give to the pt. (02 Sep 2018 23:57)      PAST MEDICAL & SURGICAL HISTORY:  Arthritis          Vital Signs Last 24 Hrs  T(C): 36.6 (03 Sep 2018 11:38), Max: 36.8 (02 Sep 2018 22:25)  T(F): 97.9 (03 Sep 2018 11:38), Max: 98.2 (02 Sep 2018 22:25)  HR: 75 (03 Sep 2018 11:38) (72 - 88)  BP: 117/54 (03 Sep 2018 11:38) (100/62 - 164/72)  BP(mean): --  RR: 16 (03 Sep 2018 11:38) (16 - 20)  SpO2: 96% (03 Sep 2018 11:38) (96% - 100%)    Neurological Exam:   Mental status: Awake, alert and oriented x3.  Recent and remote memory intact.  Naming, repetition and comprehension intact.  Attention/concentration intact.  No dysarthria, no aphasia.  Fund of knowledge appropriate.    Cranial nerves: Pupils equally round and reactive to light, visual fields full, no nystagmus, extraocular muscles intact, V1 through V3 intact bilaterally and symmetric, face symmetric, hearing intact to finger rub, palate elevation symmetric, tongue was midline.  Motor:  MRC grading 5/5 b/l UE/LE.   strength 5/5.  Normal tone and bulk.  No abnormal movements.    Sensation: Intact to light touch, proprioception, and pinprick.   Coordination: No dysmetria on finger-to-nose and heel-to-shin.  No dysdiadokinesia.  Reflexes: 2+ in bilateral UE/LE, downgoing toes bilaterally. (-) Rios.  Gait: Narrow and steady. No ataxia.  Romberg negative    acetaminophen   Tablet. 650 milliGRAM(s) Oral every 6 hours PRN  aspirin  chewable 81 milliGRAM(s) Oral daily  atorvastatin 80 milliGRAM(s) Oral at bedtime  enoxaparin Injectable 40 milliGRAM(s) SubCutaneous every 24 hours  metoclopramide Injectable 10 milliGRAM(s) IV Push every 8 hours PRN  pantoprazole    Tablet 40 milliGRAM(s) Oral before breakfast      Labs:  CBC Full  -  ( 03 Sep 2018 07:42 )  WBC Count : 8.99 K/uL  Hemoglobin : 11.7 g/dL  Hematocrit : 36.5 %  Platelet Count - Automated : 163 K/uL  Mean Cell Volume : 92.2 fL  Mean Cell Hemoglobin : 29.5 pg  Mean Cell Hemoglobin Concentration : 32.1 g/dL  Auto Neutrophil # : 6.55 K/uL  Auto Lymphocyte # : 1.87 K/uL  Auto Monocyte # : 0.49 K/uL  Auto Eosinophil # : 0.03 K/uL  Auto Basophil # : 0.03 K/uL  Auto Neutrophil % : 72.9 %  Auto Lymphocyte % : 20.8 %  Auto Monocyte % : 5.5 %  Auto Eosinophil % : 0.3 %  Auto Basophil % : 0.3 %    09-03    141  |  103  |  12  ----------------------------<  82  4.0   |  26  |  0.7    Ca    8.7      03 Sep 2018 07:42    TPro  8.0  /  Alb  4.7  /  TBili  0.4  /  DBili  x   /  AST  35  /  ALT  27  /  AlkPhos  63  09-02    LIVER FUNCTIONS - ( 02 Sep 2018 22:20 )  Alb: 4.7 g/dL / Pro: 8.0 g/dL / ALK PHOS: 63 U/L / ALT: 27 U/L / AST: 35 U/L / GGT: x           PT/INR - ( 02 Sep 2018 22:20 )   PT: 10.40 sec;   INR: 0.96 ratio         PTT - ( 02 Sep 2018 22:20 )  PTT:29.7 sec  Urinalysis Basic - ( 02 Sep 2018 23:15 )    Color: Yellow / Appearance: Clear / SG: >=1.030 / pH: x  Gluc: x / Ketone: 15  / Bili: Negative / Urobili: 0.2   Blood: x / Protein: 30 / Nitrite: Negative   Leuk Esterase: Small / RBC: x / WBC 6-10 /HPF   Sq Epi: x / Non Sq Epi: Few /HPF / Bacteria: Few /HPF    < from: Transthoracic Echocardiogram (09.03.18 @ 07:19) >  Summary:   1. Left ventricular ejection fraction, by visual estimation, is 55 to   60%.   2. Spectral Doppler shows impaired relaxation pattern of left   ventricular myocardial filling (Grade I diastolic dysfunction).   3. Trace tricuspid regurgitation.    < end of copied text >    Assessment:  This is a 67y Female w/ h/o     Plan: Neurology Progress Note    Interval History:  Pt currently back to baseline.  No focal deficits.      PAST MEDICAL & SURGICAL HISTORY:  Arthritis    Vital Signs Last 24 Hrs  T(C): 36.6 (03 Sep 2018 11:38), Max: 36.8 (02 Sep 2018 22:25)  T(F): 97.9 (03 Sep 2018 11:38), Max: 98.2 (02 Sep 2018 22:25)  HR: 75 (03 Sep 2018 11:38) (72 - 88)  BP: 117/54 (03 Sep 2018 11:38) (100/62 - 164/72)  BP(mean): --  RR: 16 (03 Sep 2018 11:38) (16 - 20)  SpO2: 96% (03 Sep 2018 11:38) (96% - 100%)    Neurological Exam:   Mental status: Awake, alert and oriented x3.  Recent and remote memory intact.  Naming, repetition and comprehension intact.  Attention/concentration intact.  No dysarthria, no aphasia.  Fund of knowledge appropriate.    Cranial nerves: Pupils equally round and reactive to light, visual fields full, no nystagmus, extraocular muscles intact, V1 through V3 intact bilaterally and symmetric, face symmetric, hearing intact to finger rub, palate elevation symmetric, tongue was midline.  Motor:  MRC grading 5/5 b/l UE/LE.   strength 5/5.  Normal tone and bulk.  No abnormal movements.    Sensation: Intact to light touch, proprioception, and pinprick.   Coordination: No dysmetria on finger-to-nose and heel-to-shin.  No dysdiadokinesia.  Reflexes: 2+ in bilateral UE/LE, downgoing toes bilaterally. (-) Rios.  Gait: Narrow and steady. No ataxia.  Romberg negative    NIHSS 0    acetaminophen   Tablet. 650 milliGRAM(s) Oral every 6 hours PRN  aspirin  chewable 81 milliGRAM(s) Oral daily  atorvastatin 80 milliGRAM(s) Oral at bedtime  enoxaparin Injectable 40 milliGRAM(s) SubCutaneous every 24 hours  metoclopramide Injectable 10 milliGRAM(s) IV Push every 8 hours PRN  pantoprazole    Tablet 40 milliGRAM(s) Oral before breakfast    Labs:  CBC Full  -  ( 03 Sep 2018 07:42 )  WBC Count : 8.99 K/uL  Hemoglobin : 11.7 g/dL  Hematocrit : 36.5 %  Platelet Count - Automated : 163 K/uL  Mean Cell Volume : 92.2 fL  Mean Cell Hemoglobin : 29.5 pg  Mean Cell Hemoglobin Concentration : 32.1 g/dL  Auto Neutrophil # : 6.55 K/uL  Auto Lymphocyte # : 1.87 K/uL  Auto Monocyte # : 0.49 K/uL  Auto Eosinophil # : 0.03 K/uL  Auto Basophil # : 0.03 K/uL  Auto Neutrophil % : 72.9 %  Auto Lymphocyte % : 20.8 %  Auto Monocyte % : 5.5 %  Auto Eosinophil % : 0.3 %  Auto Basophil % : 0.3 %    09-03    141  |  103  |  12  ----------------------------<  82  4.0   |  26  |  0.7    Ca    8.7      03 Sep 2018 07:42    TPro  8.0  /  Alb  4.7  /  TBili  0.4  /  DBili  x   /  AST  35  /  ALT  27  /  AlkPhos  63  09-02    LIVER FUNCTIONS - ( 02 Sep 2018 22:20 )  Alb: 4.7 g/dL / Pro: 8.0 g/dL / ALK PHOS: 63 U/L / ALT: 27 U/L / AST: 35 U/L / GGT: x           PT/INR - ( 02 Sep 2018 22:20 )   PT: 10.40 sec;   INR: 0.96 ratio         PTT - ( 02 Sep 2018 22:20 )  PTT:29.7 sec  Urinalysis Basic - ( 02 Sep 2018 23:15 )    Color: Yellow / Appearance: Clear / SG: >=1.030 / pH: x  Gluc: x / Ketone: 15  / Bili: Negative / Urobili: 0.2   Blood: x / Protein: 30 / Nitrite: Negative   Leuk Esterase: Small / RBC: x / WBC 6-10 /HPF   Sq Epi: x / Non Sq Epi: Few /HPF / Bacteria: Few /HPF    < from: Transthoracic Echocardiogram (09.03.18 @ 07:19) >  Summary:   1. Left ventricular ejection fraction, by visual estimation, is 55 to   60%.   2. Spectral Doppler shows impaired relaxation pattern of left   ventricular myocardial filling (Grade I diastolic dysfunction).   3. Trace tricuspid regurgitation.    < end of copied text >

## 2018-09-03 NOTE — DISCHARGE NOTE ADULT - CARE PLAN
Principal Discharge DX:	Acute nonintractable headache, unspecified headache type  Goal:	Treat appropriately and avoid complications  Secondary Diagnosis:	Numbness and tingling in left arm  Goal:	Treat appropriately and avoid complications Principal Discharge DX:	TIA (transient ischemic attack)  Goal:	Treat appropriately and avoid complications  Assessment and plan of treatment:	You were admitted with c/o headache, nausea, vomiting, left sided numbness & weakness, also had a facial droop, a stroke code was called in the ED. On further imaging and work up acute stroke was excluded. Please use the medications as prescribed and follow up with Dr.Yu leung as outpatient in 2weeks. Please go to the nearest ED if you develop similar symptoms.  Secondary Diagnosis:	Acute nonintractable headache, unspecified headache type  Goal:	Treat appropriately and avoid complications  Assessment and plan of treatment:	You complained of headache during admission which resolved now. You had h/o migraines since 24. Please continue taking your medications and follow up with the neurologist.

## 2018-09-03 NOTE — DISCHARGE NOTE ADULT - PROVIDER TOKENS
LORETTA:'[id number]' TOKEN:'[id number]',FREE:[LAST:[joce],FIRST:[jay],PHONE:[phone]],FAX:[(   )    -],ADDRESS:[[address] [place] [address]]]

## 2018-09-03 NOTE — DISCHARGE NOTE ADULT - PATIENT PORTAL LINK FT
You can access the [facility] Patient Portal, offered by [facility], by registering with the following website: [email or web address]

## 2018-09-03 NOTE — DISCHARGE NOTE ADULT - MEDICATION SUMMARY - MEDICATIONS TO TAKE
I will START or STAY ON the medications listed below when I get home from the hospital:    aspirin 81 mg oral tablet, chewable  -- 1 tab(s) by mouth once a day  -- Indication: For TIA    Lipitor 40 mg oral tablet  -- 1 tab(s) by mouth once a day  -- Indication: For TIA

## 2018-09-03 NOTE — DISCHARGE NOTE ADULT - CARE PROVIDERS DIRECT ADDRESSES
,[email or web address],DirectAddress_Unknown

## 2018-09-03 NOTE — PROGRESS NOTE ADULT - ASSESSMENT
SHAHAB GARRISON 67y Female  MRN#: [id number]   CODE STATUS: Full code      SUBJECTIVE    Patient is a 67y old Female who presents with a chief complaint of Numbness and tingling in left arm (02 Sep 2018 23:57)  Currently admitted to medicine with the primary diagnosis of TIA vs. stroke vs. complicated migraine.    Interval History: Today is hospital day 1d. Today she is no longer complaining of numbness, tingling, or weakness. No facial droop. States likely history of migraines involving nausea followed by severe, typically left-sided headache with associated photophobia and phonophobia. Occasional emesis. Typically resolved after around 12 hours away from light and sound. Occurring since the age of 24. Occasional tingling of right side of face. No other associated neurological symptoms. Not relieved by NSAID's. Has not tried other abortive medications. States that along with yesterday's symptoms, she had burning-like pain of left chest radiating to left arm.    HPI:   HPI:  67 F w/ pmh of migraines, Osteoporosis, OA, not on any meds besides advil prn presented to the ED by EMS c/o headache, nausea, vomiting, l sided numbness & weakness, she also had a facial droop, a stroke code was called in the ED. The onset of the pts symptoms began around 7pm, after she ate, she said she began having heartburn which she says triggers her migraines. After a CTH was negative, Dr. Rangel evaluated the patient through tele-communications, total NIH score was 1, at the time of that eval all sxs had resolved except L sided numbness, the pt and family refused Tpa, they insisted sxs are being cause by her migraine. Pts L sided numbness resolved as well. Aspirin 325, Lipitor 40, & Reglan were give to the pt. (02 Sep 2018 23:57)    Hospital Course:       PAST MEDICAL & SURGICAL HISTORY  Arthritis      ALLERGIES:  No Known Allergies      MEDICATIONS:  STANDING MEDICATIONS  aspirin  chewable 81 milliGRAM(s) Oral daily  atorvastatin 80 milliGRAM(s) Oral at bedtime  enoxaparin Injectable 40 milliGRAM(s) SubCutaneous every 24 hours  pantoprazole    Tablet 40 milliGRAM(s) Oral before breakfast    PRN MEDICATIONS  acetaminophen   Tablet. 650 milliGRAM(s) Oral every 6 hours PRN  metoclopramide Injectable 10 milliGRAM(s) IV Push every 8 hours PRN          OBJECTIVE    VITAL SIGNS: Last 24 Hours  T(C): 36.1 (03 Sep 2018 07:38), Max: 36.8 (02 Sep 2018 22:25)  T(F): 96.9 (03 Sep 2018 07:38), Max: 98.2 (02 Sep 2018 22:25)  HR: 72 (03 Sep 2018 07:38) (72 - 88)  BP: 110/55 (03 Sep 2018 07:38) (100/62 - 164/72)  BP(mean): --  RR: 16 (03 Sep 2018 07:38) (16 - 20)  SpO2: 96% (03 Sep 2018 07:38) (96% - 100%)      PHYSICAL EXAM:    GENERAL: No acute distress.  PULMONARY: Clear to auscultation bilaterally; No wheeze  CARDIOVASCULAR: Regular rate and rhythm; No murmurs, rubs, or gallops  NEUROLOGY: Cranial nerves intact. PERRLA. EOMI. Symmetric elevation of palate. Intact and symmetric sensation. Intact use of facial muscles. Strength 5/5 (+/- possible right pronator drift). Sensation intact throughout extremities. Gait with some imbalance on heel-to-shin. Coordination intact on finger-to-nose test.      LABS:                        11.7   8.99  )-----------( 163      ( 03 Sep 2018 07:42 )             36.5     09-03    141  |  103  |  12  ----------------------------<  82  4.0   |  26  |  0.7    Ca    8.7      03 Sep 2018 07:42    TPro  8.0  /  Alb  4.7  /  TBili  0.4  /  DBili  x   /  AST  35  /  ALT  27  /  AlkPhos  63  09-02    PT/INR - ( 02 Sep 2018 22:20 )   PT: 10.40 sec;   INR: 0.96 ratio         PTT - ( 02 Sep 2018 22:20 )  PTT:29.7 sec  Urinalysis Basic - ( 02 Sep 2018 23:15 )    Color: Yellow / Appearance: Clear / SG: >=1.030 / pH: x  Gluc: x / Ketone: 15  / Bili: Negative / Urobili: 0.2   Blood: x / Protein: 30 / Nitrite: Negative   Leuk Esterase: Small / RBC: x / WBC 6-10 /HPF   Sq Epi: x / Non Sq Epi: Few /HPF / Bacteria: Few /HPF        Creatine Kinase, Serum: 125 U/L (09-02-18 @ 22:20)  Troponin T, Serum: <0.01 ng/mL (09-02-18 @ 22:20)      CARDIAC MARKERS ( 02 Sep 2018 22:20 )  x     / <0.01 ng/mL / 125 U/L / x     / x              RADIOLOGY:    CT Head No Cont (09.02.18 @ 23:18):  1.  No evidence of acute intracranial pathology.  2.  Chronic microvascular ischemic changes as above.    CT Perfusion w/ Maps w/ IV Cont (09.02.18 @ 23:19):  1.  No perfusion defects to suggest acute cerebral ischemia.    2.  Unremarkable CTA of the head and neck.        ASSESSMENT AND PLAN    Patient is a 67y old Female who presents with a chief complaint of Migraine vs Stroke (02 Sep 2018 23:57)  Currently admitted to medicine with the primary diagnosis of Numbness and tingling in left arm  Hospital course has been complicated by .     # TIA vs Stroke vs Migraine  - F/u MRI  - CT angio/perfusion negative  - Asp, Lipitor  - Neurochecks q4h  - F/u neuro recs in AM    # HO Migraines / OA / Osteoporosis  - Home meds    Arthritis
This is a 67y Female with h/o migraine, OP/OA p/w acute HA w/ CP, LUE paresthesias with residual persistent numbness L side currently back to baseline suspect TIA.      Plan:  - f/u MRI/MRA - if negative, may d/c with outpt f/u in 2 - 4 wks  - continue ASA 81  - continue statin

## 2018-09-03 NOTE — DISCHARGE NOTE ADULT - CARE PROVIDER_API CALL
Hector Rangel), Neurology; Neuromuscular Medicine  [address] [place] [address] [id number]  Phone: [phone]  Fax: [phone] Hector Rangel), Neurology; Neuromuscular Medicine  [address] [place] [address] [id number]  Phone: [phone]  Fax: [phone]    jay hobson  [address] [place] [address]  Phone: [phone]  Fax: (       -

## 2018-09-03 NOTE — DISCHARGE NOTE ADULT - HOSPITAL COURSE
67 F w/ pmh of migraines, Osteoporosis, OA, not on any meds besides advil prn presented to the ED by EMS c/o headache, nausea, vomiting, lt sided numbness & weakness, she also had a facial droop, a stroke code was called in the ED. The onset of the pts symptoms began around 7pm, after she ate, she said she began having heartburn which she says triggers her migraines. After a CTH was negative, Dr. Rangel evaluated the patient through tele-communications, total NIH score was 1, at the time of that eval all sxs had resolved except L sided numbness, the pt and family refused Tpa, they insisted sxs are being cause by her migraine. Pts L sided numbness resolved as well. Aspirin 325, Lipitor 40, & Reglan were give to the patient. She is no longer complaining of numbness, tingling, or weakness. No facial droop. States likely history of migraines involving nausea followed by severe, typically left-sided headache with associated photophobia and phonophobia. Occasional emesis. Typically resolved after around 12 hours away from light and sound. Occurring since the age of 24. Occasional tingling of right side of face. No other associated neurological symptoms. Not relieved by NSAID's. Has not tried other abortive medications. States that along with yesterday's symptoms, she had burning-like pain of left chest radiating to left arm. Cardiac enzymes are negative. Patient had imaging done on admission which did not show any acute stroke. Patient can follow up with Dr. Rangel as outpatient in 2weeks. 67 F w/ pmh of migraines, Osteoporosis, OA, not on any meds besides advil prn presented to the ED by EMS c/o headache, nausea, vomiting, lt sided numbness & weakness, she also had a facial droop, a stroke code was called in the ED. The onset of the pts symptoms began around 7pm, after she ate, she said she began having heartburn which she says triggers her migraines. After a CTH was negative, Dr. Ranegl evaluated the patient through tele-communications, total NIH score was 1, at the time of that eval all sxs had resolved except L sided numbness, the pt and family refused Tpa, they insisted sxs are being cause by her migraine. Pts L sided numbness resolved as well. Aspirin 325, Lipitor 40, & Reglan were give to the patient. She is no longer complaining of numbness, tingling, or weakness. No facial droop. States that along with yesterday's symptoms, she had burning-like pain of left chest radiating to left arm. Cardiac enzymes are negative. Patient had imaging done on admission which did not show any acute stroke. Patient can follow up with Dr. Rangel as outpatient in 2weeks.

## 2018-09-03 NOTE — DISCHARGE NOTE ADULT - PLAN OF CARE
Treat appropriately and avoid complications You were admitted with c/o headache, nausea, vomiting, left sided numbness & weakness, also had a facial droop, a stroke code was called in the ED. On further imaging and work up acute stroke was excluded. Please use the medications as prescribed and follow up with Dr.Yu leung as outpatient in 2weeks. Please go to the nearest ED if you develop similar symptoms. You complained of headache during admission which resolved now. You had h/o migraines since 24. Please continue taking your medications and follow up with the neurologist.

## 2018-09-07 DIAGNOSIS — R20.0 ANESTHESIA OF SKIN: ICD-10-CM

## 2018-09-07 DIAGNOSIS — R51 HEADACHE: ICD-10-CM

## 2018-09-07 DIAGNOSIS — M19.90 UNSPECIFIED OSTEOARTHRITIS, UNSPECIFIED SITE: ICD-10-CM

## 2018-09-07 DIAGNOSIS — G43.909 MIGRAINE, UNSPECIFIED, NOT INTRACTABLE, WITHOUT STATUS MIGRAINOSUS: ICD-10-CM

## 2018-09-07 DIAGNOSIS — R29.810 FACIAL WEAKNESS: ICD-10-CM

## 2018-09-07 DIAGNOSIS — G45.9 TRANSIENT CEREBRAL ISCHEMIC ATTACK, UNSPECIFIED: ICD-10-CM

## 2018-09-07 DIAGNOSIS — M81.0 AGE-RELATED OSTEOPOROSIS WITHOUT CURRENT PATHOLOGICAL FRACTURE: ICD-10-CM

## 2019-01-28 ENCOUNTER — OUTPATIENT (OUTPATIENT)
Dept: OUTPATIENT SERVICES | Facility: HOSPITAL | Age: 69
LOS: 1 days | Discharge: HOME | End: 2019-01-28

## 2019-01-28 DIAGNOSIS — N20.0 CALCULUS OF KIDNEY: ICD-10-CM

## 2019-01-28 PROBLEM — M19.90 UNSPECIFIED OSTEOARTHRITIS, UNSPECIFIED SITE: Chronic | Status: ACTIVE | Noted: 2018-09-02

## 2019-03-19 ENCOUNTER — TRANSCRIPTION ENCOUNTER (OUTPATIENT)
Age: 69
End: 2019-03-19

## 2019-10-21 ENCOUNTER — EMERGENCY (EMERGENCY)
Facility: HOSPITAL | Age: 69
LOS: 0 days | Discharge: HOME | End: 2019-10-21
Attending: EMERGENCY MEDICINE | Admitting: EMERGENCY MEDICINE
Payer: MEDICARE

## 2019-10-21 VITALS
HEART RATE: 90 BPM | SYSTOLIC BLOOD PRESSURE: 144 MMHG | TEMPERATURE: 98 F | RESPIRATION RATE: 18 BRPM | DIASTOLIC BLOOD PRESSURE: 67 MMHG | OXYGEN SATURATION: 99 %

## 2019-10-21 DIAGNOSIS — N39.0 URINARY TRACT INFECTION, SITE NOT SPECIFIED: ICD-10-CM

## 2019-10-21 DIAGNOSIS — R10.30 LOWER ABDOMINAL PAIN, UNSPECIFIED: ICD-10-CM

## 2019-10-21 LAB
ALBUMIN SERPL ELPH-MCNC: 4.5 G/DL — SIGNIFICANT CHANGE UP (ref 3.5–5.2)
ALP SERPL-CCNC: 58 U/L — SIGNIFICANT CHANGE UP (ref 30–115)
ALT FLD-CCNC: 28 U/L — SIGNIFICANT CHANGE UP (ref 0–41)
ANION GAP SERPL CALC-SCNC: 15 MMOL/L — HIGH (ref 7–14)
APPEARANCE UR: ABNORMAL
AST SERPL-CCNC: 36 U/L — SIGNIFICANT CHANGE UP (ref 0–41)
BACTERIA # UR AUTO: NEGATIVE — SIGNIFICANT CHANGE UP
BASOPHILS # BLD AUTO: 0.04 K/UL — SIGNIFICANT CHANGE UP (ref 0–0.2)
BASOPHILS NFR BLD AUTO: 0.2 % — SIGNIFICANT CHANGE UP (ref 0–1)
BILIRUB SERPL-MCNC: 0.4 MG/DL — SIGNIFICANT CHANGE UP (ref 0.2–1.2)
BILIRUB UR-MCNC: NEGATIVE — SIGNIFICANT CHANGE UP
BUN SERPL-MCNC: 12 MG/DL — SIGNIFICANT CHANGE UP (ref 10–20)
CALCIUM SERPL-MCNC: 10 MG/DL — SIGNIFICANT CHANGE UP (ref 8.5–10.1)
CHLORIDE SERPL-SCNC: 101 MMOL/L — SIGNIFICANT CHANGE UP (ref 98–110)
CO2 SERPL-SCNC: 26 MMOL/L — SIGNIFICANT CHANGE UP (ref 17–32)
COLOR SPEC: COLORLESS — SIGNIFICANT CHANGE UP
CREAT SERPL-MCNC: 0.8 MG/DL — SIGNIFICANT CHANGE UP (ref 0.7–1.5)
DIFF PNL FLD: ABNORMAL
EOSINOPHIL # BLD AUTO: 0.1 K/UL — SIGNIFICANT CHANGE UP (ref 0–0.7)
EOSINOPHIL NFR BLD AUTO: 0.6 % — SIGNIFICANT CHANGE UP (ref 0–8)
EPI CELLS # UR: 0 /HPF — SIGNIFICANT CHANGE UP (ref 0–5)
GLUCOSE SERPL-MCNC: 100 MG/DL — HIGH (ref 70–99)
GLUCOSE UR QL: NEGATIVE — SIGNIFICANT CHANGE UP
HCT VFR BLD CALC: 39.3 % — SIGNIFICANT CHANGE UP (ref 37–47)
HGB BLD-MCNC: 13 G/DL — SIGNIFICANT CHANGE UP (ref 12–16)
HYALINE CASTS # UR AUTO: 1 /LPF — SIGNIFICANT CHANGE UP (ref 0–7)
IMM GRANULOCYTES NFR BLD AUTO: 0.3 % — SIGNIFICANT CHANGE UP (ref 0.1–0.3)
KETONES UR-MCNC: ABNORMAL
LEUKOCYTE ESTERASE UR-ACNC: ABNORMAL
LIDOCAIN IGE QN: 35 U/L — SIGNIFICANT CHANGE UP (ref 7–60)
LYMPHOCYTES # BLD AUTO: 13.1 % — LOW (ref 20.5–51.1)
LYMPHOCYTES # BLD AUTO: 2.33 K/UL — SIGNIFICANT CHANGE UP (ref 1.2–3.4)
MCHC RBC-ENTMCNC: 30.7 PG — SIGNIFICANT CHANGE UP (ref 27–31)
MCHC RBC-ENTMCNC: 33.1 G/DL — SIGNIFICANT CHANGE UP (ref 32–37)
MCV RBC AUTO: 92.7 FL — SIGNIFICANT CHANGE UP (ref 81–99)
MONOCYTES # BLD AUTO: 0.65 K/UL — HIGH (ref 0.1–0.6)
MONOCYTES NFR BLD AUTO: 3.7 % — SIGNIFICANT CHANGE UP (ref 1.7–9.3)
NEUTROPHILS # BLD AUTO: 14.55 K/UL — HIGH (ref 1.4–6.5)
NEUTROPHILS NFR BLD AUTO: 82.1 % — HIGH (ref 42.2–75.2)
NITRITE UR-MCNC: NEGATIVE — SIGNIFICANT CHANGE UP
NRBC # BLD: 0 /100 WBCS — SIGNIFICANT CHANGE UP (ref 0–0)
PH UR: 8.5 — HIGH (ref 5–8)
PLATELET # BLD AUTO: 179 K/UL — SIGNIFICANT CHANGE UP (ref 130–400)
POTASSIUM SERPL-MCNC: 3.9 MMOL/L — SIGNIFICANT CHANGE UP (ref 3.5–5)
POTASSIUM SERPL-SCNC: 3.9 MMOL/L — SIGNIFICANT CHANGE UP (ref 3.5–5)
PROT SERPL-MCNC: 7.7 G/DL — SIGNIFICANT CHANGE UP (ref 6–8)
PROT UR-MCNC: ABNORMAL
RBC # BLD: 4.24 M/UL — SIGNIFICANT CHANGE UP (ref 4.2–5.4)
RBC # FLD: 13.6 % — SIGNIFICANT CHANGE UP (ref 11.5–14.5)
RBC CASTS # UR COMP ASSIST: 300 /HPF — HIGH (ref 0–4)
SODIUM SERPL-SCNC: 142 MMOL/L — SIGNIFICANT CHANGE UP (ref 135–146)
SP GR SPEC: 1.01 — LOW (ref 1.01–1.02)
UROBILINOGEN FLD QL: SIGNIFICANT CHANGE UP
WBC # BLD: 17.73 K/UL — HIGH (ref 4.8–10.8)
WBC # FLD AUTO: 17.73 K/UL — HIGH (ref 4.8–10.8)
WBC UR QL: 159 /HPF — HIGH (ref 0–5)

## 2019-10-21 PROCEDURE — 74176 CT ABD & PELVIS W/O CONTRAST: CPT | Mod: 26

## 2019-10-21 PROCEDURE — 99284 EMERGENCY DEPT VISIT MOD MDM: CPT | Mod: GC

## 2019-10-21 RX ORDER — MORPHINE SULFATE 50 MG/1
4 CAPSULE, EXTENDED RELEASE ORAL ONCE
Refills: 0 | Status: DISCONTINUED | OUTPATIENT
Start: 2019-10-21 | End: 2019-10-21

## 2019-10-21 RX ORDER — CEFPODOXIME PROXETIL 100 MG
1 TABLET ORAL
Qty: 14 | Refills: 0
Start: 2019-10-21 | End: 2019-10-27

## 2019-10-21 RX ORDER — SODIUM CHLORIDE 9 MG/ML
1000 INJECTION INTRAMUSCULAR; INTRAVENOUS; SUBCUTANEOUS ONCE
Refills: 0 | Status: COMPLETED | OUTPATIENT
Start: 2019-10-21 | End: 2019-10-21

## 2019-10-21 RX ORDER — KETOROLAC TROMETHAMINE 30 MG/ML
30 SYRINGE (ML) INJECTION ONCE
Refills: 0 | Status: DISCONTINUED | OUTPATIENT
Start: 2019-10-21 | End: 2019-10-21

## 2019-10-21 RX ADMIN — SODIUM CHLORIDE 1000 MILLILITER(S): 9 INJECTION INTRAMUSCULAR; INTRAVENOUS; SUBCUTANEOUS at 14:53

## 2019-10-21 RX ADMIN — Medication 30 MILLIGRAM(S): at 14:53

## 2019-10-21 RX ADMIN — MORPHINE SULFATE 4 MILLIGRAM(S): 50 CAPSULE, EXTENDED RELEASE ORAL at 17:06

## 2019-10-21 NOTE — ED PROVIDER NOTE - OBJECTIVE STATEMENT
69 year old female with a pmh of htn presents here c/o suprapubic abdominal pain.Pain is 10/10 radiating to the left flank. SYmptoms associated with urinary frequencyurgency and burning. when symptoms began today patient took 1 tablet of minurial. Patient denies any fever chills cough cp sob n/v .

## 2019-10-21 NOTE — ED PROVIDER NOTE - NS ED ROS FT
Constitutional: See HPI.  Eyes: No visual changes,  ENMT:  No neck pain   Cardiac: No cp  Respiratory: No cough  GI: No nausea, vomiting, + abdominal pain  : see hpi  MS: left flank pain  Psych: No suicidal or homicidal ideations.  Neuro: No headache   Skin: No skin rash.

## 2019-10-21 NOTE — ED PROVIDER NOTE - ATTENDING CONTRIBUTION TO CARE
69F PMH freq uti, vaginal dryness on estrogen, p/w acute onset sharp suprapubic pain radiates to L flank, constant. assoc w dysuria, freq and hematuria. no fever. no nvdc. psh- c sections, hysterectomy. no hx kidney stones. pt took a dose of Fosfomycin antibiotic she had from [place] and states that made the pain worse. no cp, sob.     on exam, AFVSS, well henrique nad, ncat, eomi, perrla, mmm, lctab, rrr nl s1s2 no mrg, abd soft mild suprapubic ttp, +LCVAT, nd, aaox3, no focal deficits, no le edema or calf ttp,     a/p; concern for uti/pyelo r/o renal colic, will do labs, ua, ct, ivf, toradol, re-eval. H&P  not consistent w dissection, appendicitis.

## 2019-10-21 NOTE — ED PROVIDER NOTE - PHYSICAL EXAMINATION
CONSTITUTIONAL: WA / WN / NAD  HEAD: NCAT  EYES: PERRL; EOMI;   ENT: Normal pharynx; mucous membranes pink/moist, no erythema.  NECK: Supple; no meningeal signs  CARD: RRR; nl S1/S2; no M/R/G.   RESP: Respiratory rate and effort are normal; breath sounds clear and equal bilaterally.  ABD: Soft, ND + suprapubic ttp + left CVA  MSK/EXT: No gross deformities; full range of motion.  SKIN: Warm and dry;   NEURO: AAOx3,  PSYCH: Memory Intact, Normal Affect

## 2019-10-21 NOTE — ED PROVIDER NOTE - PATIENT PORTAL LINK FT
You can access the FollowMyHealth Patient Portal offered by [facility] by registering at the following website: [email or web address]. By joining [employer]’s FollowMyHealth portal, you will also be able to view your health information using other applications (apps) compatible with our system.

## 2019-10-21 NOTE — ED ADULT NURSE NOTE - NSIMPLEMENTINTERV_GEN_ALL_ED
Implemented All Fall Risk Interventions:  [place] to call system. Call bell, personal items and telephone within reach. Instruct patient to call for assistance. Room bathroom lighting operational. Non-slip footwear when patient is off stretcher. Physically safe environment: no spills, clutter or unnecessary equipment. Stretcher in lowest position, wheels locked, appropriate side rails in place. Provide visual cue, wrist band, yellow gown, etc. Monitor gait and stability. Monitor for mental status changes and reorient to person, place, and time. Review medications for side effects contributing to fall risk. Reinforce activity limits and safety measures with patient and family.

## 2019-10-21 NOTE — ED PROVIDER NOTE - CLINICAL SUMMARY MEDICAL DECISION MAKING FREE TEXT BOX
pt feels better, CT no kidney stones, UA+ will treat for pyelo, well henrique tolerating po, afebrile, will dc home w pmd f/u 1 week, strict return precautions provided

## 2019-10-22 LAB
CULTURE RESULTS: NO GROWTH — SIGNIFICANT CHANGE UP
SPECIMEN SOURCE: SIGNIFICANT CHANGE UP

## 2021-03-31 ENCOUNTER — APPOINTMENT (OUTPATIENT)
Dept: OTOLARYNGOLOGY | Facility: CLINIC | Age: 71
End: 2021-03-31

## 2022-03-28 ENCOUNTER — APPOINTMENT (OUTPATIENT)
Dept: OTOLARYNGOLOGY | Facility: CLINIC | Age: 72
End: 2022-03-28
Payer: MEDICARE

## 2022-03-28 ENCOUNTER — APPOINTMENT (OUTPATIENT)
Dept: OTOLARYNGOLOGY | Facility: CLINIC | Age: 72
End: 2022-03-28

## 2022-03-28 VITALS
HEART RATE: 78 BPM | WEIGHT: 106 LBS | SYSTOLIC BLOOD PRESSURE: 136 MMHG | OXYGEN SATURATION: 100 % | TEMPERATURE: 98.1 F | HEIGHT: 60 IN | DIASTOLIC BLOOD PRESSURE: 83 MMHG | BODY MASS INDEX: 20.81 KG/M2

## 2022-03-28 DIAGNOSIS — H65.23 CHRONIC SEROUS OTITIS MEDIA, BILATERAL: ICD-10-CM

## 2022-03-28 DIAGNOSIS — Z78.9 OTHER SPECIFIED HEALTH STATUS: ICD-10-CM

## 2022-03-28 DIAGNOSIS — H91.90 UNSPECIFIED HEARING LOSS, UNSPECIFIED EAR: ICD-10-CM

## 2022-03-28 PROCEDURE — 31231 NASAL ENDOSCOPY DX: CPT

## 2022-03-28 PROCEDURE — 99204 OFFICE O/P NEW MOD 45 MIN: CPT | Mod: 25

## 2022-03-28 PROCEDURE — 92550 TYMPANOMETRY & REFLEX THRESH: CPT

## 2022-03-28 PROCEDURE — 92557 COMPREHENSIVE HEARING TEST: CPT

## 2022-03-28 RX ORDER — AZITHROMYCIN 250 MG/1
TABLET, FILM COATED ORAL
Refills: 0 | Status: ACTIVE | COMMUNITY

## 2022-03-28 RX ORDER — AMOXICILLIN AND CLAVULANATE POTASSIUM 875; 125 MG/1; MG/1
875-125 TABLET, COATED ORAL
Qty: 20 | Refills: 0 | Status: ACTIVE | COMMUNITY
Start: 2022-03-28 | End: 1900-01-01

## 2022-03-28 RX ORDER — FAMOTIDINE 20 MG/1
20 TABLET, FILM COATED ORAL
Qty: 60 | Refills: 0 | Status: ACTIVE | COMMUNITY
Start: 2022-02-13

## 2022-03-28 RX ORDER — OMEPRAZOLE 40 MG/1
40 CAPSULE, DELAYED RELEASE ORAL
Qty: 7 | Refills: 0 | Status: ACTIVE | COMMUNITY
Start: 2022-03-28 | End: 1900-01-01

## 2022-03-28 RX ORDER — METHYLPREDNISOLONE 4 MG/1
4 TABLET ORAL
Qty: 1 | Refills: 0 | Status: ACTIVE | COMMUNITY
Start: 2022-03-28 | End: 1900-01-01

## 2022-03-28 RX ORDER — CONJUGATED ESTROGENS 0.62 MG/G
0.62 CREAM VAGINAL
Qty: 30 | Refills: 0 | Status: ACTIVE | COMMUNITY
Start: 2021-10-06

## 2022-03-28 RX ORDER — SULFAMETHOXAZOLE AND TRIMETHOPRIM 800; 160 MG/1; MG/1
800-160 TABLET ORAL
Qty: 14 | Refills: 0 | Status: ACTIVE | COMMUNITY
Start: 2021-10-04

## 2022-03-28 NOTE — PROCEDURE
[Posterior Lesion] : posterior lesion [Anterior rhinoscopy insufficient to account for symptoms] : anterior rhinoscopy insufficient to account for symptoms [Flexible Endoscope] : examined with the flexible endoscope [Congested] : congested [Allergic] : allergic signs [Normal] : the paranasal sinuses had no abnormalities [Serial Number: ___] : Serial Number: [unfilled] [FreeTextEntry6] : donen to r/o npx mass with geoff - clear

## 2022-03-28 NOTE — PHYSICAL EXAM
[Nasal Endoscopy Performed] : nasal endoscopy was performed, see procedure section for findings [Normal] : assessment of respiratory effort is normal [de-identified] : geoff [de-identified] : gait steady

## 2022-03-28 NOTE — HISTORY OF PRESENT ILLNESS
[de-identified] : 70 yo F I has eliseo in the past with sx of endolymphatic hydrops had done well recently but had uri , nasal congestion, developed r hl,then left she notices her eac feels a bump on r not on left. r hl x 5 d ; l more recent r worse than l; l tinnitus more than r. Denies vertigo. Has not had this in the past. Here with her . No FH or SH relevant to cc.

## 2022-03-28 NOTE — ASSESSMENT
[FreeTextEntry1] : uri/ar/etd>>geoff\par has to fly in 2 d to [place] and cannot move trip\par I asked her to autoinsufflate - she could clear r ear but not l; I recommended moving trip - they would not; then I prescribed augmentin prescribed , medrol, omep , afrin (no htn dm pud infx or psych) \par rtc 2 d if no better discussed risks beneifts and alts to myringotomy vs myrongotomy and pe tube\par asked ot call for any problems

## 2022-03-30 ENCOUNTER — NON-APPOINTMENT (OUTPATIENT)
Age: 72
End: 2022-03-30

## 2022-03-30 ENCOUNTER — APPOINTMENT (OUTPATIENT)
Dept: OTOLARYNGOLOGY | Facility: CLINIC | Age: 72
End: 2022-03-30

## 2022-05-26 ENCOUNTER — APPOINTMENT (OUTPATIENT)
Dept: OTOLARYNGOLOGY | Facility: CLINIC | Age: 72
End: 2022-05-26
Payer: MEDICARE

## 2022-05-26 VITALS
OXYGEN SATURATION: 98 % | HEART RATE: 80 BPM | TEMPERATURE: 97.9 F | BODY MASS INDEX: 20.78 KG/M2 | WEIGHT: 105.82 LBS | HEIGHT: 60 IN | SYSTOLIC BLOOD PRESSURE: 136 MMHG | DIASTOLIC BLOOD PRESSURE: 70 MMHG | RESPIRATION RATE: 14 BRPM

## 2022-05-26 PROCEDURE — 92557 COMPREHENSIVE HEARING TEST: CPT

## 2022-05-26 PROCEDURE — 92550 TYMPANOMETRY & REFLEX THRESH: CPT

## 2022-05-26 PROCEDURE — 99214 OFFICE O/P EST MOD 30 MIN: CPT | Mod: 25

## 2022-05-26 PROCEDURE — 31231 NASAL ENDOSCOPY DX: CPT

## 2022-05-26 RX ORDER — METHYLPREDNISOLONE 4 MG/1
4 TABLET ORAL
Qty: 1 | Refills: 0 | Status: ACTIVE | COMMUNITY
Start: 2022-05-26 | End: 1900-01-01

## 2022-05-26 RX ORDER — OFLOXACIN OTIC 3 MG/ML
0.3 SOLUTION AURICULAR (OTIC)
Qty: 1 | Refills: 1 | Status: ACTIVE | COMMUNITY
Start: 2022-05-26 | End: 1900-01-01

## 2022-05-26 RX ORDER — FAMOTIDINE 40 MG/1
40 TABLET, FILM COATED ORAL DAILY
Qty: 7 | Refills: 0 | Status: ACTIVE | COMMUNITY
Start: 2022-05-26 | End: 1900-01-01

## 2022-05-26 NOTE — HISTORY OF PRESENT ILLNESS
[de-identified] : 2 month followup for this 72 y/o F here with  presenting with r aural fullness and tinnitus for 2 weeks. She had h/o Meniere's disease in  the past. She had been on lo sodium diet and diuretic in the past. She states her r ear started "buzzing" 2-3 weeks ago and felt clogged. She denies vertigo.

## 2022-05-26 NOTE — REASON FOR VISIT
[Subsequent Evaluation] : a subsequent evaluation for [FreeTextEntry2] : r aural fullness, tinnitus

## 2022-05-26 NOTE — PROCEDURE
[Posterior Lesion] : posterior lesion [Anterior rhinoscopy insufficient to account for symptoms] : anterior rhinoscopy insufficient to account for symptoms [Topical Lidocaine] : topical lidocaine [Oxymetazoline HCl] : oxymetazoline HCl [Flexible Endoscope] : examined with the flexible endoscope [Normal] : the paranasal sinuses had no abnormalities [Serial Number: ___] : Serial Number: [unfilled] [FreeTextEntry6] : done to check npx\par clear

## 2022-05-26 NOTE — DATA REVIEWED
[de-identified] :  showed R asymmetric snhl significant decrease from prior; Tympanograms: Right- Type A, Left- Type C- results reviewed with pt

## 2022-05-26 NOTE — ASSESSMENT
[FreeTextEntry1] : R sosnhl with associated tinnitus; history of endolymphatic hydrops\par - showed R asymmetric snhl; Tympanograms: Right- Type A, Left- Type C- results reviewed with pt \par -discussed risks and benefits and alts to po and IT dexamethasone injxn\par -no HTN, DM, PUD, infxn, or psych \par -pt did not wish to have IT steroids, she preferred PO steroids\par -Medrol (she does not want hi dose prednisone)\par -Famotidine (pt has osteoporosis)\par -MRI for asymmetric snhl\par -rec she continue lo sodium diet\par lo sodium diet 1500 mg and max 25 half tab (I confirmed with pt on phone)\par RTC in 1 week with MRI to review findings and

## 2022-06-06 ENCOUNTER — APPOINTMENT (OUTPATIENT)
Dept: OTOLARYNGOLOGY | Facility: CLINIC | Age: 72
End: 2022-06-06
Payer: MEDICARE

## 2022-06-06 VITALS
HEART RATE: 67 BPM | DIASTOLIC BLOOD PRESSURE: 78 MMHG | BODY MASS INDEX: 20.62 KG/M2 | WEIGHT: 105 LBS | HEIGHT: 60 IN | OXYGEN SATURATION: 99 % | RESPIRATION RATE: 16 BRPM | TEMPERATURE: 97.8 F | SYSTOLIC BLOOD PRESSURE: 173 MMHG

## 2022-06-06 PROCEDURE — 69801 INCISE INNER EAR: CPT | Mod: RT

## 2022-06-06 PROCEDURE — 92550 TYMPANOMETRY & REFLEX THRESH: CPT

## 2022-06-06 PROCEDURE — 99214 OFFICE O/P EST MOD 30 MIN: CPT | Mod: 25

## 2022-06-06 PROCEDURE — 92557 COMPREHENSIVE HEARING TEST: CPT

## 2022-06-06 RX ORDER — METHYLPREDNISOLONE 4 MG/1
4 TABLET ORAL
Qty: 21 | Refills: 0 | Status: ACTIVE | COMMUNITY
Start: 2022-05-26

## 2022-06-06 RX ORDER — OFLOXACIN OTIC 3 MG/ML
0.3 SOLUTION AURICULAR (OTIC)
Qty: 1 | Refills: 1 | Status: ACTIVE | COMMUNITY
Start: 2022-06-06 | End: 1900-01-01

## 2022-06-06 NOTE — PHYSICAL EXAM
[de-identified] : r monomer [de-identified] : gait steady [Normal] : assessment of respiratory effort is normal

## 2022-06-06 NOTE — ASSESSMENT
[FreeTextEntry1] : r sudden hl\par likely Meniere's\par encouraged to follow 1500 mg sodium diet and take diuretic -she agreed\par discussed risks benefits and alts to r IT dex injx\par she wished to proceed\par done\par dep\par drops 2d\par rtc 1 week with

## 2022-06-06 NOTE — DATA REVIEWED
[de-identified] : r asymm snhl worse in mid freqs - reviewed with pt and  [de-identified] : mri reviewed images with pt and I called them when read by radiology to explain radiologist had seen only mv isch change-  no acute cva or tumor

## 2022-06-06 NOTE — PROCEDURE
[Risk and Benefits Discussed] : The purpose, risks, discomforts, benefits and alternatives of the procedure have been explained to the patient including no treatment. [facility] [Sudden Hearing Loss] : Sudden Hearing Loss [Same] : same as the Pre Op Dx. [] : Intratympanic Therapy [FreeTextEntry4] : phenol [FreeTextEntry6] : 0.3 cc dex injected AD IT atraumatically under microscope

## 2022-06-06 NOTE — HISTORY OF PRESENT ILLNESS
[de-identified] : 1 week follow up visit for this 72 yo F with h/o R sudden hl in the past and now another r sudden hl for 3 weeks. At last visit, medrol dosepack was ordered which she took but feels hearing is worse. She does not want stronger course of prednisone. I ordered diuretic and lo sodium diet - she has been following diet but not did not take diuretic. She feels her hearing is worse and tinnitus is louder denies vertigo. Had mri and is here with her  to review.

## 2022-06-13 ENCOUNTER — APPOINTMENT (OUTPATIENT)
Dept: OTOLARYNGOLOGY | Facility: CLINIC | Age: 72
End: 2022-06-13
Payer: MEDICARE

## 2022-06-13 VITALS
SYSTOLIC BLOOD PRESSURE: 146 MMHG | WEIGHT: 110 LBS | HEIGHT: 60 IN | BODY MASS INDEX: 21.6 KG/M2 | HEART RATE: 78 BPM | TEMPERATURE: 97.9 F | DIASTOLIC BLOOD PRESSURE: 70 MMHG | OXYGEN SATURATION: 99 %

## 2022-06-13 PROCEDURE — 69801 INCISE INNER EAR: CPT | Mod: RT

## 2022-06-13 PROCEDURE — 99214 OFFICE O/P EST MOD 30 MIN: CPT | Mod: 25

## 2022-06-13 PROCEDURE — 92557 COMPREHENSIVE HEARING TEST: CPT

## 2022-06-13 NOTE — PROCEDURE
[Sudden Hearing Loss] : Sudden Hearing Loss [Same] : same as the Pre Op Dx. [] : Intratympanic Therapy [Risk and Benefits Discussed] : The purpose, risks, discomforts, benefits and alternatives of the procedure have been explained to the patient including no treatment. [facility] [FreeTextEntry4] : Phenol  [FreeTextEntry5] : 0.3 cc Dexamethasone injected AD IT atraumatically under microscope

## 2022-06-13 NOTE — PHYSICAL EXAM
[Normal] : no nystagmus [de-identified] : R TM healed; L TM nl- b TMs intact  [de-identified] : gait steady

## 2022-06-13 NOTE — ASSESSMENT
[FreeTextEntry1] : r sosnhl likely Meniere's with innitus as well. \par - showed r snhl improved WR improved to 96% from 40% from 22, L snhl stable- results reviewed with pt \par -s/p medrol dose pack, she does not want stronger po steroids. She is on careful 1500 mg sodium diet but she does not want diuretic. \par -discussed risks and benefits to another IT dexamethasone injxn- pt has had one so far\par -pt wished to proceed\par -done\par -dep\par -Ofloxicin 2 days\par -continue lo sodium diet- 1500 mg\par -for tinnitus recommended air pod maskers, increase background noise/ try white noise machine\par RTC in 1 week with \par discussed hbo - she prefers to hold off

## 2022-06-13 NOTE — DATA REVIEWED
[de-identified] :  showed r snhl sifnificantly improved WR improved to 96% from 40% from 22, L snhl stable- results reviewed with pt and her

## 2022-06-13 NOTE — HISTORY OF PRESENT ILLNESS
[de-identified] : 1 week follow up visit for this 70 y/o F here with  with h/o R sudden hl in the past, and now another r sudden hl approximately 1 month ago. Pt completed medrol dose pack, and had one IT dexamethasone injection. She is currently following lo sodium diet, and is not taking diuretic. She does not notice a change in hearing. She is still c/o occasional R tinnitus. Here with her .

## 2022-06-22 ENCOUNTER — APPOINTMENT (OUTPATIENT)
Dept: OTOLARYNGOLOGY | Facility: CLINIC | Age: 72
End: 2022-06-22
Payer: MEDICARE

## 2022-06-22 VITALS
BODY MASS INDEX: 21.6 KG/M2 | HEART RATE: 79 BPM | OXYGEN SATURATION: 99 % | WEIGHT: 110 LBS | SYSTOLIC BLOOD PRESSURE: 152 MMHG | DIASTOLIC BLOOD PRESSURE: 78 MMHG | HEIGHT: 60 IN | RESPIRATION RATE: 18 BRPM | TEMPERATURE: 98 F

## 2022-06-22 PROCEDURE — 99214 OFFICE O/P EST MOD 30 MIN: CPT | Mod: 25

## 2022-06-22 PROCEDURE — 69801 INCISE INNER EAR: CPT | Mod: RT

## 2022-06-22 PROCEDURE — 92557 COMPREHENSIVE HEARING TEST: CPT

## 2022-06-22 NOTE — PROCEDURE
[Risk and Benefits Discussed] : The purpose, risks, discomforts, benefits and alternatives of the procedure have been explained to the patient including no treatment. [facility] [[facility] Loss] : Sudden Hearing Loss [Same] : same as the Pre Op Dx. [] : Intratympanic Therapy [FreeTextEntry4] : phenol [FreeTextEntry6] : 0.3 cc dex injected AD IT atraumatically under microscope

## 2022-06-22 NOTE — ASSESSMENT
[FreeTextEntry1] : r Meniere's with recurrent sudden hl\par has had 2 IT dex injxs and po steroids\par she prefers no diuretic but is following lo sodium diet and hydrating\par risks benefits and alts to another IT dex injx discussed\par she wished to proceed\par done\par dep\par drops 2d (she said she would call if she does not have more)\par rtc 1-2 weeks with , continue lo sodium diet and hydration

## 2022-06-22 NOTE — PHYSICAL EXAM
[de-identified] : healed [Normal] : assessment of respiratory effort is normal [de-identified] : gait steady

## 2022-06-22 NOTE — HISTORY OF PRESENT ILLNESS
[de-identified] : 9 d followup visit for this 72 yo F with h/o r Meniere's and second episode of sosnhl. She has had po Medrol and two dexamethasone injections. She is following low sodium diet carefully but prefers not to take diuretic. She notices no significant change in her hearing.

## 2022-07-05 ENCOUNTER — APPOINTMENT (OUTPATIENT)
Dept: OTOLARYNGOLOGY | Facility: CLINIC | Age: 72
End: 2022-07-05

## 2022-07-05 VITALS
TEMPERATURE: 98 F | BODY MASS INDEX: 20.62 KG/M2 | DIASTOLIC BLOOD PRESSURE: 77 MMHG | WEIGHT: 105 LBS | RESPIRATION RATE: 17 BRPM | OXYGEN SATURATION: 99 % | HEIGHT: 60 IN | SYSTOLIC BLOOD PRESSURE: 155 MMHG | HEART RATE: 67 BPM

## 2022-07-05 PROCEDURE — 92557 COMPREHENSIVE HEARING TEST: CPT

## 2022-07-05 PROCEDURE — 99214 OFFICE O/P EST MOD 30 MIN: CPT

## 2022-07-05 RX ORDER — OFLOXACIN OTIC 3 MG/ML
0.3 SOLUTION AURICULAR (OTIC)
Qty: 1 | Refills: 1 | Status: ACTIVE | COMMUNITY
Start: 2022-07-05 | End: 1900-01-01

## 2022-07-05 NOTE — HISTORY OF PRESENT ILLNESS
[de-identified] : 13 d followup appt for this 72 yo F with r sudden hl. She had had 2 IT dex injxs and is on lo sodium diet, hydration and diuretic for Meniere's disease. She will no allow po steroids due to potential for osteoporosis. I explained the risk is low because this is short-course but she still prefers not to take po steroids. She feels her hearing worsened. She had uri and noticed mucus in nose and ear last week. The uri has since resolved. She is here with her .

## 2022-07-05 NOTE — ASSESSMENT
[FreeTextEntry1] : r worsened hearing\par hard dry ucus removed from r eac\par likely worsened with uri\par she does not want po steroids and had 3 IT dex injxs\par ofloxacin drops 1 week - ordered\par dep\par questions answered\par rtc 2 weeks when back in town

## 2022-07-05 NOTE — REASON FOR VISIT
[Subsequent Evaluation] : a subsequent evaluation for Sepsis due to Enterobacter species [FreeTextEntry2] : followup r sudden hl

## 2022-07-05 NOTE — PHYSICAL EXAM
[Normal] : assessment of respiratory effort is normal [de-identified] : l ok; r hard mucus removed from eac overlying tm- perf has not healed [de-identified] : gait steady

## 2022-08-05 ENCOUNTER — APPOINTMENT (OUTPATIENT)
Dept: OTOLARYNGOLOGY | Facility: CLINIC | Age: 72
End: 2022-08-05

## 2022-08-05 VITALS
WEIGHT: 105 LBS | HEIGHT: 60 IN | HEART RATE: 86 BPM | OXYGEN SATURATION: 98 % | TEMPERATURE: 98.2 F | DIASTOLIC BLOOD PRESSURE: 70 MMHG | RESPIRATION RATE: 16 BRPM | SYSTOLIC BLOOD PRESSURE: 159 MMHG | BODY MASS INDEX: 20.62 KG/M2

## 2022-08-05 DIAGNOSIS — H90.6 MIXED CONDUCTIVE AND SENSORINEURAL HEARING LOSS, BILATERAL: ICD-10-CM

## 2022-08-05 PROCEDURE — 92557 COMPREHENSIVE HEARING TEST: CPT

## 2022-08-05 PROCEDURE — 92550 TYMPANOMETRY & REFLEX THRESH: CPT

## 2022-08-05 PROCEDURE — 99213 OFFICE O/P EST LOW 20 MIN: CPT

## 2022-08-05 NOTE — ASSESSMENT
[FreeTextEntry1] : likely etd in addn tor asymm snhl\par rec: saline (cannot use flonase as has cataracts)\par allegra (prefers not d)\par rtc 6 weeks when back in town\par no better>>consider myringotomy and or balloon dilation

## 2022-08-05 NOTE — HISTORY OF PRESENT ILLNESS
[de-identified] : 1 mo followup appt for this 72 yo F who has had r sudden hl multiple x most cw MD - most recently had 3 IT dex injxs and Medrol dosepack. She feels vince r hearing may be worse and that her ear feels full. here with her .

## 2022-08-05 NOTE — DATA REVIEWED
[de-identified] : r asymm hl; r mixed; l mixed but mild sn component - likely no significant hearin change\par type c tymps

## 2022-08-10 ENCOUNTER — APPOINTMENT (OUTPATIENT)
Dept: OTOLARYNGOLOGY | Facility: CLINIC | Age: 72
End: 2022-08-10

## 2022-09-21 ENCOUNTER — APPOINTMENT (OUTPATIENT)
Dept: OTOLARYNGOLOGY | Facility: CLINIC | Age: 72
End: 2022-09-21

## 2022-09-21 VITALS
WEIGHT: 105 LBS | OXYGEN SATURATION: 99 % | RESPIRATION RATE: 16 BRPM | BODY MASS INDEX: 20.62 KG/M2 | SYSTOLIC BLOOD PRESSURE: 152 MMHG | HEIGHT: 60 IN | HEART RATE: 72 BPM | DIASTOLIC BLOOD PRESSURE: 79 MMHG | TEMPERATURE: 97.7 F

## 2022-09-21 DIAGNOSIS — L98.9 DISORDER OF THE SKIN AND SUBCUTANEOUS TISSUE, UNSPECIFIED: ICD-10-CM

## 2022-09-21 DIAGNOSIS — H69.80 OTHER SPECIFIED DISORDERS OF EUSTACHIAN TUBE, UNSPECIFIED EAR: ICD-10-CM

## 2022-09-21 PROCEDURE — 99214 OFFICE O/P EST MOD 30 MIN: CPT

## 2022-09-21 PROCEDURE — 92557 COMPREHENSIVE HEARING TEST: CPT

## 2022-09-21 PROCEDURE — 92567 TYMPANOMETRY: CPT

## 2022-09-21 RX ORDER — AZELASTINE HYDROCHLORIDE 137 UG/1
137 SPRAY, METERED NASAL DAILY
Qty: 1 | Refills: 3 | Status: ACTIVE | COMMUNITY
Start: 2022-09-21 | End: 1900-01-01

## 2022-09-21 NOTE — PHYSICAL EXAM
[de-identified] : 4 mm lesion, red on l dorsum of nose [Normal] : no neck adenopathy [de-identified] : nasal lesion [de-identified] : gait steady

## 2022-09-21 NOTE — DATA REVIEWED
[de-identified] : r asymm lf rising snhl big improvement since last visit compared - for a few mo significant improvement tao estrada

## 2022-09-21 NOTE — ASSESSMENT
[FreeTextEntry1] : 1) r Meniere's disease - continue low sodium diet and diuretic - doing well\par 2) etd - has cataracts flonase contraindicated - astelin prescribed (discussed sudafed - but she has htn)\par 3) htn see internist\par 4) nasal lesion- refer Dr Foote- [place] gave her his contact info\par \par rtc 3 mo and as needed

## 2022-09-21 NOTE — HISTORY OF PRESENT ILLNESS
[de-identified] : 6 week followup appt for this 72 yo F with h/o r Meniere's disease- she has gone to [place] also still on lo sodium diet and diuretic. She also has h/o ETS and has had some fluctuating fullness in the r ear. Denies pain or \par drainage. She also shows a small lesion on the dorsum of he nose. Was referred for Moh's surgery and wanted another opinion. Here with her .

## 2022-10-07 ENCOUNTER — TRANSCRIPTION ENCOUNTER (OUTPATIENT)
Age: 72
End: 2022-10-07

## 2022-12-21 ENCOUNTER — APPOINTMENT (OUTPATIENT)
Dept: OTOLARYNGOLOGY | Facility: CLINIC | Age: 72
End: 2022-12-21

## 2023-03-27 ENCOUNTER — NON-APPOINTMENT (OUTPATIENT)
Age: 73
End: 2023-03-27

## 2023-04-17 ENCOUNTER — APPOINTMENT (OUTPATIENT)
Dept: OTOLARYNGOLOGY | Facility: CLINIC | Age: 73
End: 2023-04-17
Payer: MEDICARE

## 2023-04-17 VITALS
DIASTOLIC BLOOD PRESSURE: 81 MMHG | HEART RATE: 77 BPM | TEMPERATURE: 98.2 F | BODY MASS INDEX: 20.42 KG/M2 | WEIGHT: 104 LBS | OXYGEN SATURATION: 96 % | HEIGHT: 60 IN | SYSTOLIC BLOOD PRESSURE: 139 MMHG | RESPIRATION RATE: 14 BRPM

## 2023-04-17 PROCEDURE — 99214 OFFICE O/P EST MOD 30 MIN: CPT

## 2023-04-17 NOTE — PHYSICAL EXAM
[Normal] : assessment of respiratory effort is normal [] : [place] test is negative [de-identified] : gait steady

## 2023-04-17 NOTE — HISTORY OF PRESENT ILLNESS
[de-identified] : 71 yo f with sx c/w r cochlear Meniere's now presenting with r persistent pounding sound in ear and dizzy for 2 weeks; if lay down and get up loses balance. Vertigo is moderate in severity. no uri or inciting event.On low sodium diet but could not tolerate diuretic in the past - only took 1 dose.

## 2023-04-17 NOTE — ASSESSMENT
[FreeTextEntry1] : vertigo, r tinnitus, known r hl\par could be progression to full Meniere's or something else\par advised 1500 mg sodium diet, hydrate\par she prefers no meclizine\par rc with  vng mri

## 2023-04-26 ENCOUNTER — APPOINTMENT (OUTPATIENT)
Dept: OTOLARYNGOLOGY | Facility: CLINIC | Age: 73
End: 2023-04-26
Payer: MEDICARE

## 2023-04-26 VITALS
RESPIRATION RATE: 16 BRPM | SYSTOLIC BLOOD PRESSURE: 147 MMHG | DIASTOLIC BLOOD PRESSURE: 83 MMHG | HEIGHT: 60 IN | WEIGHT: 104 LBS | BODY MASS INDEX: 20.42 KG/M2 | OXYGEN SATURATION: 99 % | HEART RATE: 75 BPM | TEMPERATURE: 98 F

## 2023-04-26 PROCEDURE — 92540 BASIC VESTIBULAR EVALUATION: CPT

## 2023-04-26 PROCEDURE — 99214 OFFICE O/P EST MOD 30 MIN: CPT | Mod: 25

## 2023-04-26 PROCEDURE — 92557 COMPREHENSIVE HEARING TEST: CPT

## 2023-04-26 PROCEDURE — 92537 CALORIC VSTBLR TEST W/REC: CPT

## 2023-04-26 PROCEDURE — 92550 TYMPANOMETRY & REFLEX THRESH: CPT | Mod: 52

## 2023-04-26 PROCEDURE — 69801 INCISE INNER EAR: CPT | Mod: RT

## 2023-04-26 RX ORDER — HYDROCHLOROTHIAZIDE 12.5 MG/1
12.5 TABLET ORAL DAILY
Qty: 30 | Refills: 2 | Status: ACTIVE | COMMUNITY
Start: 2023-04-26 | End: 1900-01-01

## 2023-04-26 RX ORDER — OFLOXACIN OTIC 3 MG/ML
0.3 SOLUTION AURICULAR (OTIC)
Qty: 1 | Refills: 1 | Status: ACTIVE | COMMUNITY
Start: 2023-04-26 | End: 1900-01-01

## 2023-04-26 RX ORDER — OMEPRAZOLE 40 MG/1
40 CAPSULE, DELAYED RELEASE ORAL
Qty: 7 | Refills: 0 | Status: ACTIVE | COMMUNITY
Start: 2023-04-26 | End: 1900-01-01

## 2023-04-26 RX ORDER — METHYLPREDNISOLONE 4 MG/1
4 TABLET ORAL
Qty: 1 | Refills: 0 | Status: ACTIVE | COMMUNITY
Start: 2023-04-26 | End: 1900-01-01

## 2023-04-26 NOTE — ASSESSMENT
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\par -MRI showed T2 white matter changes, cervical arthritis otherwise unremarkable -images and report reviewed with pt and \par -VNG revealed nystagmus in several positions, results inconclusive (pt had 1 cup of espresso prior to testing) -results reviewed with pt and \par - showed l hf snhl, r mixed hearing loss- decreased when compared with 2022, WR R- decreased from 100% to 20% when compared with 2022 -results reviewed with pt and \par -discussed options including BICROS HA, BAHA, vs CI\par -discussed risks and benefits and alts to po and IT dexamethasone injxn as they said hearing much worse over past 4-6 weeks\par -pt denies HTN, DM, PUD, infxn, depression/anxiety \par -medrol (she has had issue taking high dose steroids in the past)\par -omeprazole to protect stomach\par -pt wished to proceed\par -done\par -dep\par -ofloxacin 2 days\par -HCTZ 12.5 mg daily as she thought max 25 too strong\par -continue lo sodium diet- 1500 mg, hydration\par -cmp- will call pt with results\par RTC in 5 days with

## 2023-04-26 NOTE — DATA REVIEWED
[de-identified] : VNG revealed nystagmus in several positions, results inconclusive (pt had 1 cup of espresso prior to testing) -results reviewed with pt and \par  showed l hf snhl, r mixed hearing loss- decreased when compared with 2022, WR R- decreased from 100% to 20% when compared with 2022 -results reviewed with pt and  [de-identified] : MRI showed T2 white matter changes, cervical arthritis otherwise unremarkable -images and report reviewed with pt and

## 2023-04-26 NOTE — HISTORY OF PRESENT ILLNESS
[de-identified] : 9 day followup visit for this 71 y/o F here with her  with symptoms of r cochlear Meniere's Disease in the past. She is c/o r persistent pounding sound in her right ear and dizziness for the past month. She is following a lo sodium diet. She has been on a diuretic in the past, but could not tolerate it so she no longer takes this. She is here to review , VNG, MRI. She reports that her hearing has been decreased since December but much worse over the past 4-6 weeks.

## 2023-04-26 NOTE — PROCEDURE
[Risk and Benefits Discussed] : The purpose, risks, discomforts, benefits and alternatives of the procedure have been explained to the patient including no treatment. [facility] [Same] : same as the Pre Op Dx. [] : Intratympanic Therapy [Sudden Hearing Loss] : Sudden Hearing Loss [FreeTextEntry4] : phenol  [FreeTextEntry5] : 0.3 cc Dexamethasone injected AD IT atraumatically under microscope

## 2023-04-27 LAB
ALBUMIN SERPL ELPH-MCNC: 4.5 G/DL
ALP BLD-CCNC: 67 U/L
ALT SERPL-CCNC: 24 U/L
ANION GAP SERPL CALC-SCNC: 10 MMOL/L
AST SERPL-CCNC: 29 U/L
BILIRUB SERPL-MCNC: 0.3 MG/DL
BUN SERPL-MCNC: 12 MG/DL
CALCIUM SERPL-MCNC: 10.3 MG/DL
CHLORIDE SERPL-SCNC: 100 MMOL/L
CO2 SERPL-SCNC: 29 MMOL/L
CREAT SERPL-MCNC: 0.72 MG/DL
EGFR: 88 ML/MIN/1.73M2
GLUCOSE SERPL-MCNC: 92 MG/DL
POTASSIUM SERPL-SCNC: 5.1 MMOL/L
PROT SERPL-MCNC: 7.4 G/DL
SODIUM SERPL-SCNC: 140 MMOL/L

## 2023-05-01 ENCOUNTER — APPOINTMENT (OUTPATIENT)
Dept: OTOLARYNGOLOGY | Facility: CLINIC | Age: 73
End: 2023-05-01
Payer: MEDICARE

## 2023-05-01 VITALS
HEIGHT: 60 IN | RESPIRATION RATE: 16 BRPM | TEMPERATURE: 97.4 F | BODY MASS INDEX: 20.42 KG/M2 | WEIGHT: 104 LBS | SYSTOLIC BLOOD PRESSURE: 138 MMHG | HEART RATE: 81 BPM | OXYGEN SATURATION: 99 % | DIASTOLIC BLOOD PRESSURE: 79 MMHG

## 2023-05-01 DIAGNOSIS — H93.11 TINNITUS, RIGHT EAR: ICD-10-CM

## 2023-05-01 PROCEDURE — 92557 COMPREHENSIVE HEARING TEST: CPT

## 2023-05-01 PROCEDURE — 69801 INCISE INNER EAR: CPT | Mod: RT

## 2023-05-01 PROCEDURE — 99214 OFFICE O/P EST MOD 30 MIN: CPT | Mod: 25

## 2023-05-01 NOTE — DATA REVIEWED
[de-identified] :  showed l hf snhl- stable, r mixed hearing loss- low frequencies improved compared with 23, WR increased from 20% to 44% -results reviewed with pt and  [de-identified] : cmp unremarkable -results reviewed with pt and

## 2023-05-01 NOTE — HISTORY OF PRESENT ILLNESS
[de-identified] : 5 day followup visit for this 71 y/o F here with her  with symptoms of r cochlear Meniere's Disease in the past. At last visit she was found to have r sudden hearing loss. She was treated with IT dexamethasone injxn, and is on medrol dose pack and omeprazole to protect her stomach. HCTZ 12.5 mg/day was prescribed, but she had headaches and could not tolerate this so she stopped. She is following lo sodium diet. She is c/o mild stomach pain. She is here to recheck her hearing. She is c/o r aural fullness, and r tinnitus.

## 2023-05-01 NOTE — PROCEDURE
[Risk and Benefits Discussed] : The purpose, risks, discomforts, benefits and alternatives of the procedure have been explained to the patient including no treatment. [Sudden Hearing Loss] : Sudden Hearing Loss [Same] : same as the Pre Op Dx. [] : Intratympanic Therapy [facility] [Free[facility]Entry4] : phenol  [FreeTextEntry5] : 0.3 cc Dexamethasone injected AD IT atraumatically under microscope

## 2023-05-01 NOTE — REASON FOR VISIT
[Subsequent Evaluation] : a subsequent evaluation for [FreeTextEntry2] : tinnitus, hearing loss, vertigo

## 2023-05-01 NOTE — ASSESSMENT
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\par - showed l hf snhl- stable, r mixed hearing loss- low frequencies improved compared with 23, WR increased from 20% to 44% -results reviewed with pt and \par -holding off on HCTZ as pt could not tolerate this\par -continue lo sodium diet- 1500 mg, hydration\par -cmp unremarkable -results reviewed with pt and \par -finish medrol dose pack and omeprazole\par -discussed risks and benefits and alts to another IT dexamethasone injxn- pt has had one thus far\par -pt wished to proceed\par -done\par -dep\par -ofloxacin 2 days\par -discussed another course of po steroids or increasing po steroids; pt has had GI upset- recommended holding off\par RTC in 5 days with rpt  or sooner as needed \par \par

## 2023-05-09 ENCOUNTER — APPOINTMENT (OUTPATIENT)
Dept: OTOLARYNGOLOGY | Facility: CLINIC | Age: 73
End: 2023-05-09
Payer: MEDICARE

## 2023-05-09 VITALS
TEMPERATURE: 98 F | OXYGEN SATURATION: 100 % | DIASTOLIC BLOOD PRESSURE: 79 MMHG | HEART RATE: 89 BPM | HEIGHT: 60 IN | BODY MASS INDEX: 20.42 KG/M2 | WEIGHT: 104 LBS | SYSTOLIC BLOOD PRESSURE: 143 MMHG

## 2023-05-09 DIAGNOSIS — H90.A31 MIXED CONDUCTIVE AND SENSORINEURAL HEARING LOSS, UNILATERAL, RIGHT EAR WITH RESTRICTED HEARING ON THE  CONTRALATERAL SIDE: ICD-10-CM

## 2023-05-09 DIAGNOSIS — R42 DIZZINESS AND GIDDINESS: ICD-10-CM

## 2023-05-09 DIAGNOSIS — H93.11 TINNITUS, RIGHT EAR: ICD-10-CM

## 2023-05-09 PROCEDURE — 92557 COMPREHENSIVE HEARING TEST: CPT

## 2023-05-09 PROCEDURE — 69801 INCISE INNER EAR: CPT | Mod: RT

## 2023-05-09 PROCEDURE — 99214 OFFICE O/P EST MOD 30 MIN: CPT | Mod: 25

## 2023-05-09 NOTE — HISTORY OF PRESENT ILLNESS
[de-identified] : 8 day followup visit for this 73 y/o F here with her  with symptoms of r cochlear Meniere's Diseasxe in the past. She was found to have recent r sudden hearing loss. She was treated with 2 IT dexamethasone injxns, and finished a medrol dose pack. She had been on HCTZ 12.5 mg/ day in the past, but had headaches and could not tolerate this so she stopped. She is following a low sodium diet. She is here to recheck her hearing. She feels her hearing has not changed.

## 2023-05-09 NOTE — PROCEDURE
[Risk and Benefits Discussed] : The purpose, risks, discomforts, benefits and alternatives of the procedure have been explained to the patient including no treatment. [facility] [Sudden Hearing Loss] : Sudden Hearing Loss [Same] : same as the Pre Op Dx. [] : Intratympanic Therapy [FreeTextEntry4] : phenol [FreeTextEntry5] : 0.3 cc Dexamethasone injected AD IT atraumatically under microscope

## 2023-05-09 NOTE — DATA REVIEWED
[de-identified] :  showed l hf snhl, r mixed hearing loss- stable when compared with 2023, WR decreased from 44% to 28% -results reviewed with pt and

## 2023-05-09 NOTE — ASSESSMENT
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\par - showed l hf snhl, r mixed hearing loss- stable when compared with 2023, WR decreased from 44% to 28% -results reviewed with pt and \par -holding off on HCTZ as pt could not tolerate this\par -continue lo sodium diet- 1500 mg, hydration\par -s/p medrol dose pack\par -discussed risks and benefits and alts to another IT dexamethasone injxn- pt has had two thus far\par -pt wished to proceed\par -done\par -dep\par -ofloxacin 2 days\par -discussed hbo and betahistine - pt wanted to know about any other options - she said she will consider both and let us know if she wishes to proceed at next visit. \par also discussed BICROS HA and CI as future possibilities\par RTC in 5 days with rpt  or sooner as needed

## 2023-05-15 ENCOUNTER — APPOINTMENT (OUTPATIENT)
Dept: OTOLARYNGOLOGY | Facility: CLINIC | Age: 73
End: 2023-05-15

## 2023-05-18 ENCOUNTER — APPOINTMENT (OUTPATIENT)
Dept: OTOLARYNGOLOGY | Facility: CLINIC | Age: 73
End: 2023-05-18
Payer: MEDICARE

## 2023-05-18 VITALS
WEIGHT: 105 LBS | HEART RATE: 82 BPM | OXYGEN SATURATION: 100 % | DIASTOLIC BLOOD PRESSURE: 69 MMHG | TEMPERATURE: 97.3 F | SYSTOLIC BLOOD PRESSURE: 134 MMHG | RESPIRATION RATE: 17 BRPM | BODY MASS INDEX: 20.62 KG/M2 | HEIGHT: 60 IN

## 2023-05-18 DIAGNOSIS — H72.01 CENTRAL PERFORATION OF TYMPANIC MEMBRANE, RIGHT EAR: ICD-10-CM

## 2023-05-18 PROCEDURE — 92557 COMPREHENSIVE HEARING TEST: CPT

## 2023-05-18 PROCEDURE — 99213 OFFICE O/P EST LOW 20 MIN: CPT

## 2023-05-18 NOTE — ASSESSMENT
[FreeTextEntry1] : r sudden hl bone line no change and sds no change had medrol and 3 IT dex injxss\par has central tm perf 10% \par dep\par recommended another IT dex injx - she prefrs to hold off\par discussed hbo\par prefers to hold off for now\par discussed ci as option for future if does not improve\par she wishes to wait 3 weeks\par asked to follow up then, continue dep\par asked to call for issues in the meantime\par she is following low sodium diet\par they wished to understand ME pathology better - I explained I believe it is from perf - but offered ct - they preferred to hold off

## 2023-05-18 NOTE — PHYSICAL EXAM
[Normal] : assessment of respiratory effort is normal [de-identified] : r central tm perf [de-identified] : gait steady

## 2023-05-18 NOTE — DATA REVIEWED
[de-identified] : r mhl increased conducive component bone line no change sds 32% no change [de-identified] : mri 4/23 reviewed with pt

## 2023-05-18 NOTE — HISTORY OF PRESENT ILLNESS
[de-identified] : 9 d follow up appt for this 71 yo f with jose maria alvarenga MD and recent decline in hearing. She had IT dex injx at last visit (3rd) and medrol (she cannot tolerate prednisone) and feels her hearing is about the same. Here with her . She is on low sodium diet but cannot tolerate diuretic.

## 2023-06-08 ENCOUNTER — APPOINTMENT (OUTPATIENT)
Dept: OTOLARYNGOLOGY | Facility: CLINIC | Age: 73
End: 2023-06-08

## 2023-07-12 ENCOUNTER — NON-APPOINTMENT (OUTPATIENT)
Age: 73
End: 2023-07-12

## 2023-07-21 ENCOUNTER — RX RENEWAL (OUTPATIENT)
Age: 73
End: 2023-07-21

## 2024-01-05 ENCOUNTER — NON-APPOINTMENT (OUTPATIENT)
Age: 74
End: 2024-01-05

## 2024-04-08 ENCOUNTER — NON-APPOINTMENT (OUTPATIENT)
Age: 74
End: 2024-04-08

## 2024-04-09 ENCOUNTER — APPOINTMENT (OUTPATIENT)
Dept: OTOLARYNGOLOGY | Facility: CLINIC | Age: 74
End: 2024-04-09
Payer: MEDICARE

## 2024-04-09 VITALS
SYSTOLIC BLOOD PRESSURE: 152 MMHG | HEART RATE: 75 BPM | WEIGHT: 105 LBS | BODY MASS INDEX: 20.62 KG/M2 | HEIGHT: 60 IN | TEMPERATURE: 98 F | DIASTOLIC BLOOD PRESSURE: 82 MMHG | OXYGEN SATURATION: 99 %

## 2024-04-09 VITALS — SYSTOLIC BLOOD PRESSURE: 147 MMHG | DIASTOLIC BLOOD PRESSURE: 73 MMHG | HEART RATE: 74 BPM

## 2024-04-09 DIAGNOSIS — H81.01 MENIERE'S DISEASE, RIGHT EAR: ICD-10-CM

## 2024-04-09 DIAGNOSIS — H90.A31 MIXED CONDUCTIVE AND SENSORINEURAL HEARING LOSS, UNILATERAL, RIGHT EAR WITH RESTRICTED HEARING ON THE  CONTRALATERAL SIDE: ICD-10-CM

## 2024-04-09 PROCEDURE — 92550 TYMPANOMETRY & REFLEX THRESH: CPT | Mod: 52

## 2024-04-09 PROCEDURE — 92557 COMPREHENSIVE HEARING TEST: CPT

## 2024-04-09 PROCEDURE — 99213 OFFICE O/P EST LOW 20 MIN: CPT

## 2024-04-09 NOTE — REASON FOR VISIT
[Subsequent Evaluation] : a subsequent evaluation for [FreeTextEntry2] : followup r asymm hl, h/o Meniere's

## 2024-04-09 NOTE — ASSESSMENT
[FreeTextEntry1] : reassured hearing improved sds improved ha-range adviseed use ha - she agreed - ordered rtc 1 yr with elier

## 2024-04-09 NOTE — HISTORY OF PRESENT ILLNESS
[de-identified] : 11 mo follow up appt for this 72 yo f with r asymm hl and h/o recurrent hl and Meniere's sx, here with her . She is keeping a low-sodium mindset and hydrating but prefers no meds. She wished to have her hearing checked.

## 2024-04-22 ENCOUNTER — APPOINTMENT (OUTPATIENT)
Dept: PHARMACY | Facility: CLINIC | Age: 74
End: 2024-04-22
Payer: SELF-PAY

## 2024-04-22 PROCEDURE — V5010 ASSESSMENT FOR HEARING AID: CPT | Mod: NC

## 2024-05-01 ENCOUNTER — APPOINTMENT (OUTPATIENT)
Dept: OTOLARYNGOLOGY | Facility: CLINIC | Age: 74
End: 2024-05-01
Payer: MEDICARE

## 2024-05-01 VITALS
HEIGHT: 59 IN | HEART RATE: 72 BPM | BODY MASS INDEX: 21.17 KG/M2 | OXYGEN SATURATION: 99 % | TEMPERATURE: 98.1 F | WEIGHT: 105 LBS | SYSTOLIC BLOOD PRESSURE: 144 MMHG | DIASTOLIC BLOOD PRESSURE: 75 MMHG

## 2024-05-01 DIAGNOSIS — H81.01 MENIERE'S DISEASE, RIGHT EAR: ICD-10-CM

## 2024-05-01 DIAGNOSIS — H91.21 SUDDEN IDIOPATHIC HEARING LOSS, RIGHT EAR: ICD-10-CM

## 2024-05-01 DIAGNOSIS — H90.A21 SENSORINEURAL HEARING LOSS, UNILATERAL, RIGHT EAR, WITH RESTRICTED HEARING ON THE CONTRALATERAL SIDE: ICD-10-CM

## 2024-05-01 PROCEDURE — 92567 TYMPANOMETRY: CPT

## 2024-05-01 PROCEDURE — 99214 OFFICE O/P EST MOD 30 MIN: CPT

## 2024-05-01 PROCEDURE — 92557 COMPREHENSIVE HEARING TEST: CPT

## 2024-05-01 NOTE — DATA REVIEWED
[de-identified] :  showed r asymmetric snhl- pure tones decreased, l hf snhl- stable, WR decreased from 76% to 40%, b type c tympanograms (but pressure on r is type A) -results reviewed with pt   [de-identified] : mri 2023 ok

## 2024-05-01 NOTE — ASSESSMENT
[FreeTextEntry1] : R Meniere's Disease - showed r asymmetric snhl- pure tones decreased, l hf snhl- stable, WR decreased from 76% to 40%, b type c tympanograms -results reviewed with pt   -discussed risks benefits and alternatives to po and IT dexamethasone injxns- she prefers to hold off on both and understands the risk of delaying treatment -restart diuretic- she agreed -lo sodium diet- 1500 mg, stay well hydrated  RTC in 1 week with rpt ; call sooner for any issues

## 2024-05-01 NOTE — HISTORY OF PRESENT ILLNESS
[de-identified] : 22 day follow up visit for this 74 y/o F with r asymmetric snhl and h/o recurrent hearing loss and Meniere's sx, here with her . She is staying well hydrated, and keeping a low-sodium mindset, but prefers no meds. She wished to come in to check her hearing. She has noticed r hearing decrease since last visit; denies vertigo.

## 2024-05-01 NOTE — REASON FOR VISIT
[Subsequent Evaluation] : a subsequent evaluation for [FreeTextEntry2] : right asymmetric hearing loss, h/o Meniere's Disease

## 2024-05-15 ENCOUNTER — APPOINTMENT (OUTPATIENT)
Dept: OTOLARYNGOLOGY | Facility: CLINIC | Age: 74
End: 2024-05-15

## 2024-05-22 ENCOUNTER — APPOINTMENT (OUTPATIENT)
Dept: OTOLARYNGOLOGY | Facility: CLINIC | Age: 74
End: 2024-05-22

## 2024-08-20 ENCOUNTER — NON-APPOINTMENT (OUTPATIENT)
Age: 74
End: 2024-08-20

## 2024-08-21 ENCOUNTER — APPOINTMENT (OUTPATIENT)
Dept: OTOLARYNGOLOGY | Facility: CLINIC | Age: 74
End: 2024-08-21
Payer: MEDICARE

## 2024-08-21 VITALS
HEIGHT: 59 IN | WEIGHT: 105 LBS | DIASTOLIC BLOOD PRESSURE: 78 MMHG | TEMPERATURE: 98 F | BODY MASS INDEX: 21.17 KG/M2 | OXYGEN SATURATION: 99 % | HEART RATE: 72 BPM | SYSTOLIC BLOOD PRESSURE: 155 MMHG

## 2024-08-21 DIAGNOSIS — H81.01 MENIERE'S DISEASE, RIGHT EAR: ICD-10-CM

## 2024-08-21 DIAGNOSIS — H90.A21 SENSORINEURAL HEARING LOSS, UNILATERAL, RIGHT EAR, WITH RESTRICTED HEARING ON THE CONTRALATERAL SIDE: ICD-10-CM

## 2024-08-21 DIAGNOSIS — R42 DIZZINESS AND GIDDINESS: ICD-10-CM

## 2024-08-21 DIAGNOSIS — J30.1 ALLERGIC RHINITIS DUE TO POLLEN: ICD-10-CM

## 2024-08-21 DIAGNOSIS — H93.11 TINNITUS, RIGHT EAR: ICD-10-CM

## 2024-08-21 PROCEDURE — 99214 OFFICE O/P EST MOD 30 MIN: CPT

## 2024-08-21 PROCEDURE — 92550 TYMPANOMETRY & REFLEX THRESH: CPT | Mod: 52

## 2024-08-21 PROCEDURE — 92557 COMPREHENSIVE HEARING TEST: CPT

## 2024-08-21 RX ORDER — FLUTICASONE PROPIONATE 50 UG/1
50 SPRAY, METERED NASAL DAILY
Qty: 1 | Refills: 1 | Status: ACTIVE | COMMUNITY
Start: 2024-08-21 | End: 1900-01-01

## 2024-08-21 NOTE — ASSESSMENT
[FreeTextEntry1] : 1. R Meniere's Disease - showed r asymmetric snhl- pure tones slightly improved, l hf snhl- overall stable compared with 24; tympanograms- right type a, left type c -results reviewed with pt  -continue lo sodium diet- 1500 mg, hydration, she prefers no medications  -explained tinnitus is likely d/t hearing loss -for tinnitus recommended increasing background noise/ trying a white noise machine, hae, trt - she prefers to hold off  -discussed options for hearing including ha and right CI - she wishes to try ha  -recommended vestibular therapy for disequilibirum- given script and provided list of facilities  she said she will do v rehab and hae in [place] near her home 2. AR -avoid allergens -Flonase, pt denies h/o glaucoma RTC in 3 weeks to recheck hearing (her 's req) ; asked to call sooner for any issues

## 2024-08-21 NOTE — PHYSICAL EXAM
[Normal] : mucosa is normal [Midline] : trachea located in midline position [de-identified] : AR [de-identified] : AR [de-identified] : gait steady

## 2024-08-21 NOTE — HISTORY OF PRESENT ILLNESS
[de-identified] : 2.5 month followup visit for this 74 y/o F here with her  with h/o r asymmetric snhl and h/o recurrent hearing loss and Meniere's sx, here with her . She is staying well hydrated, following a lo sodium diet, but prefers no meds. She is here to recheck her hearing. She denies fluctuations in hearing. Since last visit she had 2 episodes of vertigo which lasted for a few minutes after getting up quickly and also while walking. She is c/o r tinnitus. When she flies she feels her hearing is better temporarily. She feels her nose is congested.

## 2024-08-21 NOTE — DATA REVIEWED
[de-identified] :  showed r asymmetric snhl- pure tones slightly improved, l hf snhl- overall stable compared with 24; tympanograms- right type a, left type c -results reviewed with pt

## 2024-09-16 ENCOUNTER — APPOINTMENT (OUTPATIENT)
Dept: OTOLARYNGOLOGY | Facility: CLINIC | Age: 74
End: 2024-09-16

## 2025-04-09 ENCOUNTER — APPOINTMENT (OUTPATIENT)
Dept: OTOLARYNGOLOGY | Facility: CLINIC | Age: 75
End: 2025-04-09